# Patient Record
Sex: MALE | Race: WHITE | URBAN - METROPOLITAN AREA
[De-identification: names, ages, dates, MRNs, and addresses within clinical notes are randomized per-mention and may not be internally consistent; named-entity substitution may affect disease eponyms.]

---

## 2017-06-13 ENCOUNTER — HOSPITAL ENCOUNTER (OUTPATIENT)
Dept: ULTRASOUND IMAGING | Age: 52
Discharge: HOME | End: 2017-06-13
Attending: INTERNAL MEDICINE | Admitting: INTERNAL MEDICINE

## 2017-06-13 DIAGNOSIS — N18.30 CHRONIC KIDNEY DISEASE, STAGE III (MODERATE) (HCC): ICD-10-CM

## 2017-06-13 DIAGNOSIS — N18.9 CHRONIC KIDNEY DISEASE, UNSPECIFIED STAGE: ICD-10-CM

## 2022-01-17 LAB
CHOLESTEROL, TOTAL: 98 MG/DL
CHOLESTEROL/HDL RATIO: NORMAL
HDLC SERPL-MCNC: 39 MG/DL (ref 35–70)
LDL CHOLESTEROL CALCULATED: 25 MG/DL (ref 0–160)
NONHDLC SERPL-MCNC: 59 MG/DL
TRIGL SERPL-MCNC: 169 MG/DL
VLDLC SERPL CALC-MCNC: NORMAL MG/DL

## 2022-06-22 LAB
AVERAGE GLUCOSE: NORMAL
CREATININE, URINE: 87
HBA1C MFR BLD: 8.9 %
MICROALBUMIN/CREAT 24H UR: 31.4 MG/G{CREAT}
MICROALBUMIN/CREAT UR-RTO: 361

## 2022-09-28 LAB
AVERAGE GLUCOSE: NORMAL
HBA1C MFR BLD: 8.3 %

## 2022-11-14 ENCOUNTER — TELEPHONE (OUTPATIENT)
Dept: PHARMACY | Facility: CLINIC | Age: 57
End: 2022-11-14

## 2022-11-14 ENCOUNTER — CLINICAL DOCUMENTATION (OUTPATIENT)
Dept: PHARMACY | Facility: CLINIC | Age: 57
End: 2022-11-14

## 2022-11-14 NOTE — TELEPHONE ENCOUNTER
Patient called in and asked for application to be sent to his email. [email or web address]      Pharmacy Pop Care Documentation:   Patient is missing the following requirements: DM Program Application    If completed by 11/25/22 patient will be eligible for enrollment in the DM Program on 12/01/22. Email sent.        Loretta Bhakta, [address]   Phone: [phone]       For Pharmacy Admin Tracking Only    CPA in place:  No  Time Spent (min): 20

## 2022-11-14 NOTE — PROGRESS NOTES
Pharmacy Pop Care Documentation:     AVS received for required office visit on:  9/28/22: Michael Johnson per Care Everywhere

## 2022-11-14 NOTE — LETTER
[address], [address]  Phone: toll free [phone], option 3  Fax: [address] [facility] [address] [place] [address]           11/16/22     Dear Kurt Huston,   [address]! You have successfully enrolled in the Be Well With Diabetes program for 2022. What you receive  Beginning December 1, you will begin receiving up to $300 in waived copays for specific medications and pharmacy-related supplies purchased through our home delivery pharmacy, [facility]. A list of eligible medications and pharmacy supplies can be found at [employer] under Be Well With Diabetes. In addition, you'll receive advice and help from our pharmacists, associate care management team and diabetes educators. (And, if you also participate in the Be Well program, you can earn points and Lifestyle Management or Health Management program credit, if applicable.)    What you need to do  To maintain your benefit this year and remain eligible next year, complete the following requirements:        *Can be satisfied through [employer] Screening on-site. **Requirements to enroll must be completed within 6 months of enrollment date **Pneumonia vaccine is dependent on previous immunization and your age. Remember, program requirements must be completed by deadlines shown. If not, your benefit may be terminated, and you will not be eligible to participate again until the following year. To keep you on track, we'll review your [employer] account and send reminders for action. (If you don't have a [facility] Account, submit documentation to [email or web address] or by fax to [phone].)    Congratulations and thank you for taking steps to improve your health and to Be Well With Diabetes.     [facility]  Phone: toll free [phone], option 3  Email: [email or web address]  Fax: [phone]

## 2022-11-16 NOTE — TELEPHONE ENCOUNTER
Pharmacy Pop Care Documentation:   Patient has completed all the requirements by 11/25/22 and therefore will be enrolled in the DM Program on 12/01/22. Application received: 69/97/32  Application scanned. Letter mailed to patient.       Pablo Mistry, [facility]   Phone: [phone]

## 2022-12-01 ENCOUNTER — CLINICAL DOCUMENTATION (OUTPATIENT)
Dept: PHARMACY | Facility: CLINIC | Age: 57
End: 2022-12-01

## 2022-12-01 NOTE — PROGRESS NOTES
Pharmacy Pop Care Documentation:      The application for Domi Gautam for enrollment into the diabetes management program has been reviewed and accepted on 12/1/22.     [address] in place:  No  Gap Closed?: Yes   Time Spent (min): 5

## 2022-12-07 ENCOUNTER — TELEPHONE (OUTPATIENT)
Dept: PHARMACY | Facility: CLINIC | Age: 57
End: 2022-12-07

## 2022-12-07 NOTE — TELEPHONE ENCOUNTER
**Patient is Ensemble**  Called patient to schedule 2022 yearly pharmacist appointment to discuss medications for Diabetes Management Program.    Spoke to patient and appointment scheduled for 12/13/22 at [address], [address]   Department, toll free: [phone] Option #3    For Pharmacy [address] [facility] in place:  No  Recommendation Provided To: Patient/Caregiver: 1 via Telephone  Intervention Detail: Scheduled Appointment  Gap Closed?: Yes   Intervention Accepted By: Patient/Caregiver: 1  Time Spent (min): 10

## 2022-12-13 ENCOUNTER — TELEPHONE (OUTPATIENT)
Dept: PHARMACY | Facility: CLINIC | Age: 57
End: 2022-12-13

## 2022-12-13 RX ORDER — ATORVASTATIN CALCIUM 80 MG/1
80 TABLET, FILM COATED ORAL DAILY
COMMUNITY

## 2022-12-13 RX ORDER — LOSARTAN POTASSIUM AND HYDROCHLOROTHIAZIDE 12.5; 1 MG/1; MG/1
1 TABLET ORAL DAILY
COMMUNITY

## 2022-12-13 RX ORDER — INSULIN GLARGINE 100 [IU]/ML
INJECTION, SOLUTION SUBCUTANEOUS
COMMUNITY

## 2022-12-13 RX ORDER — INSULIN LISPRO 100 [IU]/ML
INJECTION, SOLUTION INTRAVENOUS; SUBCUTANEOUS
COMMUNITY

## 2022-12-13 RX ORDER — LEVOTHYROXINE SODIUM 0.07 MG/1
75 TABLET ORAL DAILY
COMMUNITY

## 2022-12-13 NOTE — TELEPHONE ENCOUNTER
[facility] CLINICAL PHARMACY REVIEW - Be Well with Diabetes    Alyse Mcclure is a 65 y.o. male enrolled in the [facility] [employer] Diabetes Program. Patient provided Collin Saenz with verbal consent to remain in the program for this year. Patient enrolled 12/1/22. Insurance through the following employer: [employer]    Medications:  Current Outpatient Medications   Medication Sig Dispense Refill    ondansetron (ZOFRAN ODT) 4 MG disintegrating tablet Take 1-2 tablets by mouth every 12 hours as needed for Nausea May Sub regular tablet (non-ODT) if insurance does not cover ODT.  12 tablet    No longer taking 0      Current Outpatient Medications   Medication Sig Dispense Refill    losartan-hydroCHLOROthiazide (HYZAAR) 100-12.5 MG per tablet Take 1 tablet by mouth daily      atorvastatin (LIPITOR) 80 MG tablet Take 80 mg by mouth daily      levothyroxine (SYNTHROID) 75 MCG tablet Take 75 mcg by mouth Daily      insulin lispro (HUMALOG) 100 UNIT/ML SOLN injection vial Up to 120 units via insulin pump      insulin glargine (LANTUS SOLOSTAR) 100 UNIT/ML injection pen Use as directed in case of pump failure      Glucagon 3 MG/DOSE POWD by Nasal route       Current Pharmacy: [facility]  Current testing supplies/frequency: Dexcom G6 currently has back up meter- accucheck   Pen needles/syringes: has supply of pen needles for back up    Allergies:  No Known Allergies   Vitals/Labs:  BP Readings from Last 3 Encounters:   No data found for BP     Lab Results   Component Value Date    LABMICR YES 04/16/2019     Lab Results   Component Value Date    LABA1C 8.3 09/28/2022    LABA1C 10.5 (H) 09/21/2010     Lab Results   Component Value Date    CHOL 98 01/17/2022    TRIG 169 01/17/2022    HDL 39 01/17/2022    LDLCALC 25 01/17/2022     ALT   Date Value Ref Range Status   09/21/2015 30 10 - 40 U/L Final     AST   Date Value Ref Range Status   09/21/2015 23 15 - 37 U/L Final Comment:     Specimen hemolysis has exceeded the interference as defined by Roche. Value may be falsely increased. Suggest recollection if clinically  indicated. The ASCVD Risk score (Thanh MARSHALL, et al., 2019) failed to calculate for the following reasons: The systolic blood pressure is missing    The valid total cholesterol range is 130 to 320 mg/dL     Lab Results   Component Value Date    CREATININE 1.6 (H) 04/16/2019     Cr= 1.6 from 1/31/22 per CE    CrCl cannot be calculated (Patient's most recent lab result is older than the maximum 180 days allowed. ). Lab Results   Component Value Date/Time    LABGLOM 45 04/16/2019 07:33 AM    LABGLOM 49 11/12/2018 07:43 AM    LABGLOM 54 09/21/2015 06:40 PM     Immunizations: There is no immunization history on file for this patient. Social History:  Social History     Tobacco Use    Smoking status: Never    Smokeless tobacco: Not on file   Substance Use Topics    Alcohol use: Yes     Alcohol/week: 8.0 standard drinks     Types: 8 Cans of beer per week     Comment: weekends     ASSESSMENT:  Initial Program Requirements (Y indicates has completed for the year, N indicates needs to be completed by 07/01/2022): Yes - Provider Visit for DM (1st)  Yes - A1c (1st)     Ongoing Program Requirements (Y indicates has completed for the year, N indicates needs to be completed by 12/31/2022):   Yes - Provider Visit for DM (2nd)  Yes - ACC/diabetes educator visit (if A1c over 8%)  Yes - A1c (2nd)  Yes - Lipid panel  Yes - Urine microalbumin  No - Pneumococcal vaccination: [id number]  Yes - Influenza vaccination for Fall 2022  No - Medication adherence over 70%  Yes - On statin - atorvastatin  Yes - On ACEi/ARB - losartan-hctz     Current medications eligible for copay waiver, up to $300, through [address] [place]:  - atorvastatin, glucagon, lantus, humalog, levothyroxine, losartan-hydrochlorothiazide   - Prodigy and Dexcom G6     Diabetes Care:   - Glycemic Goal: <7.0% and directed by provider. Is not at blood glucose goal but is on insulin pump therapy. Type 1 DM under inadequate control as evidenced by > 8.  - Current symptoms/problems include none  - Home blood sugar records:  fasting range: 150 . Dexcom is new so no data yet  - has T slim pump  - Any episodes of hypoglycemia? no  - Known diabetic complications: none  - Eye exam current (within one year): no, reminded to schedule this yearly  - Foot exam current (within one year): yes  - Appropriateness of Insulin Therapy: insulin pump managed by primary care, he does not see endo. His primary has DM himself and is comfortable managing pump. - Therapy Optimization: rate was changed and lifestyle changes  - Medication compliance: compliant all of the time  - Diet compliance: compliant most of the time. He does bolus for his meals and is carb counting. Eats out lunch daily bc does not pack. Is doing diet drinks. BMI= 32  - Current exercise: does have a treadmill but he has been sick and not doing it. Other Considerations:  - Blood Pressure Goal: BP less than 140/90 mmHg due to history of DM: Is at blood pressure goal. 130/80 at last pcp visit. - Lipids:  at goal on current treatment atorva 80 mg  - Smoking status: Never smoked    PLAN:  - Consideration(s) for provider:   Glucagon nasal  Prodigy back up meter and supplies  Rate was changed in pump at last visit  Scr 1.6  - DM program gaps identified:   Initial requirements: Requirements met   Ongoing requirements: ACC/diabetes educator visit (if A1c over 8%) and Pneumococcal vaccination: [id number] for 2023 if needed    - Education to patient: Rafaela Activation Code sent to patient, Addressed diet and exercise, Reminded to get yearly retinal exam, Overview of Be Well With Diabetes program, and Overview of HHP   - Follow up: PCP for identified gaps or as scheduled below  - Upcoming appointments:   No future appointments.     Jason Elizabeth, PharmD, [address] Pharmacist  Department: [address] in place:  No  Recommendation Provided To: Provider: 2 via Fax sent to office  Intervention Detail: New Rx: 2, reason: Cost/Formulary Change, Needs Additional Therapy  Gap Closed?: Yes   Intervention Accepted By: Provider: 0  Time Spent (min): 60

## 2023-01-06 ENCOUNTER — TELEPHONE (OUTPATIENT)
Dept: PHARMACY | Facility: CLINIC | Age: 58
End: 2023-01-06

## 2023-01-06 NOTE — LETTER
[facility]  [facility]  [address] [place] [address]        Dave Cates   [address] [facility] [address]         01/12/23     Dear Dave Cates,      Congratulations! You have completed the 2022 requirements for the [facility] Be Well With Diabetes Program. You have been automatically re-enrolled into the [facility] Be Well With Diabetes Program for 2023.    One of the requirements to participate in the [facility] Be Well With Diabetes Program is to complete a Clinical Pharmacist Telephone appointment yearly.   The [facility] Clinical Pharmacy Team has attempted to contact you to schedule your 2023 Diabetes Management telephone appointment but was unable to reach you.     We would like to work with you and your doctor to:  - Review your medications, including over-the-counter and herbal medications  - Answer questions about your medications and how to get the most benefit from them  - Identify potential drug interactions or side effects and help fix them  - Identify preferred medications that are equally effective, but available at a lower cost to you  - Help you reach the necessary requirements to remain enrolled in the Diabetes Management Program offered by [facility]     Please call toll free [phone], option #3 to schedule this appointment to take advantage of this service. Telephone appointments are available Monday thru Friday from 7:30 AM till 5:30 PM.     This is a courtesy reminder. If you have this appointment already scheduled for your 2023 enrollment in the program, please disregard this message. If you have not scheduled this appointment yet, please contact us at the above number to schedule.     Sincerely,      [facility]  Phone: toll free [phone], option #3

## 2023-01-06 NOTE — TELEPHONE ENCOUNTER
2023 Annual Pharmacist Visit  **Patient is Ensemble**    Called patient to schedule 2023 yearly pharmacist appointment to discuss medications for Diabetes Management Program.    No answer. Left VM on TAD: Please call back at [phone] Option #3.      [address] [facility]   Department, toll free: [phone] Option #3

## 2023-01-12 NOTE — TELEPHONE ENCOUNTER
Second attempt made to contact patient to schedule 2023 yearly pharmacist appointment to discuss medications for Diabetes Management Program.    No answer. Left VM on TAD: Please call back at [phone] Option #3.      Letter mailed to patient    [address] [facility]   Department, toll free: [phone] Option #3        For Pharmacy Admin Tracking Only    Program: [address]:  No  Gap Closed?: No   Time Spent (min): 15

## 2023-02-04 ENCOUNTER — APPOINTMENT (OUTPATIENT)
Dept: GENERAL RADIOLOGY | Age: 58
End: 2023-02-04
Payer: COMMERCIAL

## 2023-02-04 ENCOUNTER — HOSPITAL ENCOUNTER (EMERGENCY)
Age: 58
Discharge: HOME | End: 2023-02-04
Payer: COMMERCIAL

## 2023-02-04 VITALS
HEART RATE: 97 BPM | RESPIRATION RATE: 16 BRPM | TEMPERATURE: 98.6 F | DIASTOLIC BLOOD PRESSURE: 84 MMHG | OXYGEN SATURATION: 95 % | BODY MASS INDEX: 32.58 KG/M2 | SYSTOLIC BLOOD PRESSURE: 144 MMHG | HEIGHT: 73 IN | WEIGHT: 245.81 LBS

## 2023-02-04 DIAGNOSIS — S69.91XA INJURY OF RIGHT HAND, INITIAL ENCOUNTER: Primary | ICD-10-CM

## 2023-02-04 DIAGNOSIS — M25.531 RIGHT WRIST PAIN: ICD-10-CM

## 2023-02-04 DIAGNOSIS — R03.0 ELEVATED BLOOD PRESSURE READING: ICD-10-CM

## 2023-02-04 PROCEDURE — 99283 EMERGENCY DEPT VISIT LOW MDM: CPT

## 2023-02-04 PROCEDURE — 73130 X-RAY EXAM OF HAND: CPT

## 2023-02-04 PROCEDURE — 73110 X-RAY EXAM OF WRIST: CPT

## 2023-02-04 ASSESSMENT — PAIN SCALES - GENERAL
PAINLEVEL_OUTOF10: 5
PAINLEVEL_OUTOF10: 5

## 2023-02-04 ASSESSMENT — PAIN DESCRIPTION - LOCATION: LOCATION: HAND

## 2023-02-04 ASSESSMENT — PAIN - FUNCTIONAL ASSESSMENT: PAIN_FUNCTIONAL_ASSESSMENT: 0-10

## 2023-02-04 ASSESSMENT — PAIN DESCRIPTION - ORIENTATION: ORIENTATION: RIGHT

## 2023-02-04 NOTE — DISCHARGE INSTRUCTIONS
Can take anti-inflammatories such as ibuprofen and Tylenol for pain relief. Use ice and elevation to further help with symptoms. If your symptoms persist or worsen make sure you follow-up with orthopedics for further evaluation as they can either repeat an x-ray or get an MRI of the area that is still bothering you. At any time if your symptoms worsen or new concerning symptoms present, make sure you return to the emergency department as soon as possible.

## 2023-02-04 NOTE — ED NOTES
Discharge and education instructions reviewed. Patient verbalized understanding, teach-back successful. Patient denied questions at this time. No acute distress noted. Patient instructed to follow-up as noted - return to emergency department if symptoms worsen. Patient verbalized understanding. Discharged per EDMD with discharged instructions.      Ally Galaviz RN  02/04/23 2532

## 2023-02-05 NOTE — ED PROVIDER NOTES
[address] [facility]        Pt Name: Skylar Ramos  MRN: [id number]  [place] 1965  Date of evaluation: 2/4/2023  Provider: Rex Yepez PA-C  PCP: Joao Her  Note Started: 7:43 PM EST 2/4/23      IVAN. I have evaluated this patient. My supervising physician was available for consultation. CHIEF COMPLAINT       Chief Complaint   Patient presents with    Hand Injury     Pt states he tripped and fell into a wall last night and now has right hand pain. HISTORY OF PRESENT ILLNESS: 1 or more Elements     {History from (Optional):53311}    {Limitations to history (Optional):52010}    Skylar Ramos is a 63 y.o. male who presents ***    Nursing Notes were all reviewed and agreed with or any disagreements were addressed in the HPI. REVIEW OF SYSTEMS :      Review of Systems    Positives and Pertinent negatives as per HPI. SURGICAL HISTORY     Past Surgical History:   Procedure Laterality Date    FRACTURE SURGERY         CURRENTMEDICATIONS       Discharge Medication List as of 2/4/2023  6:08 PM        CONTINUE these medications which have NOT CHANGED    Details   losartan-hydroCHLOROthiazide (HYZAAR) 100-12.5 MG per tablet Take 1 tablet by mouth dailyHistorical Med      atorvastatin (LIPITOR) 80 MG tablet Take 80 mg by mouth dailyHistorical Med      levothyroxine (SYNTHROID) 75 MCG tablet Take 75 mcg by mouth DailyHistorical Med      insulin lispro (HUMALOG) 100 UNIT/ML SOLN injection vial Up to 120 units via insulin pumpHistorical Med      insulin glargine (LANTUS SOLOSTAR) 100 UNIT/ML injection pen Use as directed in case of pump failureHistorical Med      Glucagon 3 MG/DOSE POWD by Nasal routeHistorical Med             ALLERGIES     Patient has no known allergies. FAMILYHISTORY     No family history on file.      SOCIAL HISTORY       Social History     Tobacco Use    Smoking status: Never   Substance Use Topics    Alcohol use: Yes     Alcohol/week: 8.0 standard drinks     Types: 8 Cans of beer per week     Comment: weekends    Drug use: No       SCREENINGS        Loretta Coma Scale  Eye Opening: Spontaneous  Best Verbal Response: Oriented  Best Motor Response: Obeys commands  Tracy Coma Scale Score: 15                CIWA Assessment  BP: (!) 144/84  Heart Rate: 97           PHYSICAL EXAM  1 or more Elements     ED Triage Vitals [02/04/23 1700]   BP Temp Temp Source Heart Rate Resp SpO2 Height Weight   (!) 145/77 98.6 °F (37 °C) Oral (!) 103 16 94 % 6' 1\" (1.854 m) 245 lb 13 oz (111.5 kg)       Physical Exam    ***    DIAGNOSTIC RESULTS   LABS:    Labs Reviewed - No data to display    When ordered only abnormal lab results are displayed. All other labs were within normal range or not returned as of this dictation. EKG: When ordered, EKG's are interpreted by the Emergency Department Physician in the absence of a cardiologist.  Please see their note for interpretation of EKG. RADIOLOGY:   Non-plain film images such as CT, Ultrasound and MRI are read by the radiologist. Plain radiographic images are visualized and preliminarily interpreted by the ED Provider with the below findings:    ***    Interpretation per the Radiologist below, if available at the time of this note:    XR HAND RIGHT (MIN 3 VIEWS)   Final Result   No acute osseous abnormality. XR WRIST RIGHT (MIN 3 VIEWS)   Final Result   No acute osseous abnormality. XR WRIST RIGHT (MIN 3 VIEWS)    Result Date: 2/4/2023  EXAMINATION: 4 XRAY VIEWS OF THE RIGHT WRIST; THREE XRAY VIEWS OF THE RIGHT HAND 2/4/2023 5:19 pm COMPARISON: None.  HISTORY: ORDERING SYSTEM PROVIDED HISTORY: pain after fall, base of thumb TECHNOLOGIST PROVIDED HISTORY: Reason for exam:->pain after fall, base of thumb Reason for Exam: pain after fall, base of thumb; ORDERING SYSTEM PROVIDED HISTORY: pain after fall, middle finger TECHNOLOGIST PROVIDED HISTORY: Reason for exam:->pain after fall, middle finger Reason for Exam: pain after fall, middle finger FINDINGS: There is no evidence of acute fracture. There is normal alignment. No acute joint abnormality. No focal osseous lesion. No focal soft tissue abnormality. Vascular calcifications. No acute osseous abnormality. XR HAND RIGHT (MIN 3 VIEWS)    Result Date: 2/4/2023  EXAMINATION: 4 XRAY VIEWS OF THE RIGHT WRIST; THREE XRAY VIEWS OF THE RIGHT HAND 2/4/2023 5:19 pm COMPARISON: None. HISTORY: ORDERING SYSTEM PROVIDED HISTORY: pain after fall, base of thumb TECHNOLOGIST PROVIDED HISTORY: Reason for exam:->pain after fall, base of thumb Reason for Exam: pain after fall, base of thumb; ORDERING SYSTEM PROVIDED HISTORY: pain after fall, middle finger TECHNOLOGIST PROVIDED HISTORY: Reason for exam:->pain after fall, middle finger Reason for Exam: pain after fall, middle finger FINDINGS: There is no evidence of acute fracture. There is normal alignment. No acute joint abnormality. No focal osseous lesion. No focal soft tissue abnormality. Vascular calcifications. No acute osseous abnormality. No results found. PROCEDURES   Unless otherwise noted below, none     Procedures    CRITICAL CARE TIME (.cctime)   ***    PAST MEDICAL HISTORY      has a past medical history of Arthritis, Diabetes mellitus ([facility] [address] Hyperlipidemia, and Hypertension. Chronic Conditions affecting Care: ***    EMERGENCY DEPARTMENT COURSE and DIFFERENTIAL DIAGNOSIS/MDM:   Vitals:    Vitals:    02/04/23 1700 02/04/23 1745   BP: (!) 145/77 (!) 144/84   Pulse: (!) 103 97   Resp: 16 16   Temp: 98.6 °F (37 °C)    TempSrc: Oral    SpO2: 94% 95%   Weight: 245 lb 13 oz (111.5 kg)    Height: 6' 1\" (1.854 m)        Patient was given the following medications:  Medications - No data to display          Is this patient to be included in the SEP-1 Core Measure due to severe sepsis or septic shock?    {Mvg3BsaRiIb:40783}    CONSULTS: (Who and What was discussed)  None  {Discussion with Other Profesionals (Optional):07198}    {Social Determinants (Optional):57252::\"None\"}    {Records Reviewed (Optional):28362}    CC/HPI Summary, DDx, ED Course, and Reassessment: ***    Disposition Considerations (include 1 Tests not done, Shared Decision Making, Pt Expectation of Test or Tx.): ***  {Escalation of care, including admission/OBS considered:77777}      I am the Primary Clinician of Record. FINAL IMPRESSION      1. Injury of right hand, initial encounter    2.  Right wrist pain          DISPOSITION/PLAN     DISPOSITION Decision To Discharge 02/04/2023 06:04:31 PM      PATIENT REFERRED TO:  Yamilka Briones MD  [address] [place] [address] Drive  [address] [place] [address]  [phone]    Schedule an appointment as soon as possible for a visit in 1 day  for reevaluation    [facility] Emergency Department  [address] [id number]  [phone]  Go to   As needed, If symptoms worsen      DISCHARGE MEDICATIONS:  Discharge Medication List as of 2/4/2023  6:08 PM          DISCONTINUED MEDICATIONS:  Discharge Medication List as of 2/4/2023  6:08 PM                 (Please note that portions of this note were completed with a voice recognition program.  Efforts were made to edit the dictations but occasionally words are mis-transcribed.)    Javid Hurst PA-C (electronically signed) and Reassessment: This is a 63y.o. year old, well-appearing male with  has a past medical history of Arthritis, Diabetes mellitus ([address] Hyperlipidemia, and Hypertension. who presents to the ED with complaint of right hand pain that began yesterday evening. Vitals upon arrival show htn, otherwise wnl. Physical Exam shows as above. Imaging was reviewed and interpreted by radiologist as above showing:   -no acute findings  Imaging was independently reviewed by myself. Consults as above. Ddx includes: fracture, contusion, muscle strain, other    Management Given:  -brace, symptoms improved  Declined medication    Disposition: discharage   Patient was informed to return to the Emergency Department if any new worsening or more concerning symptoms occur, in agreement with plan. Shared decision making was practiced, and patient discharged in stable condition. Informed to follow up with Ortho for further evaluation. Medications were prescribed as below. All questions were answered. Disposition Considerations (include 1 Tests not done, Shared Decision Making, Pt Expectation of Test or Tx.): Considered getting CT however with patient symptoms I believe the risks of radiation exposure to what it would tell us outweighed the benefits. We discussed the importance of following up with an orthopedic physician if his symptoms worsen or persist.  He understood and was in agreement        I am the Primary Clinician of Record. FINAL IMPRESSION      1. Injury of right hand, initial encounter    2. Right wrist pain    3.  Elevated blood pressure reading          DISPOSITION/PLAN     DISPOSITION Decision To Discharge 02/04/2023 06:04:31 PM      PATIENT REFERRED TO:  Nathalie Scott MD  [address] [place] [address]  [address] [place] [address]  [phone]    Schedule an appointment as soon as possible for a visit in 1 day  for reevaluation    [facility] Emergency Department  [address] [facility] [address] [place] [address]  [phone]  Go to   As needed, If symptoms worsen      DISCHARGE MEDICATIONS:  Discharge Medication List as of 2/4/2023  6:08 PM          DISCONTINUED MEDICATIONS:  Discharge Medication List as of 2/4/2023  6:08 PM                 (Please note that portions of this note were completed with a voice recognition program.  Efforts were made to edit the dictations but occasionally words are mis-transcribed.)    Darya Mcclain PA-C (electronically signed)            Darya Mcclain PA-C  02/06/23 9806

## 2023-03-07 ENCOUNTER — TELEPHONE (OUTPATIENT)
Dept: PHARMACY | Facility: CLINIC | Age: 58
End: 2023-03-07

## 2023-03-07 NOTE — TELEPHONE ENCOUNTER
**Patient is Ensemble**     2023 Annual Pharmacist Visit    Called patient to schedule 2023 yearly pharmacist appointment to discuss medications for Diabetes Management Program.    No answer. Left VM.      Please call back at [phone], option #3         [address]   Phone: [phone], option #3

## 2023-03-14 NOTE — TELEPHONE ENCOUNTER
Second attempt made to contact patient to schedule 2023 yearly pharmacist appointment to discuss medications for Diabetes Management Program.      No answer. Left VM. Please call back at [phone], option #3. Pixatet message sent.         Sameer Lynette, [address]   Phone: [phone], option #3     For Pharmacy Admin Tracking Only    Program: [address] place:  No  Gap Closed?: No   Time Spent (min): 10

## 2023-03-15 LAB
AVERAGE GLUCOSE: NORMAL
HBA1C MFR BLD: 8.2 %

## 2023-04-06 ENCOUNTER — TELEPHONE (OUTPATIENT)
Dept: PHARMACY | Facility: CLINIC | Age: 58
End: 2023-04-06

## 2023-04-06 ENCOUNTER — CLINICAL DOCUMENTATION (OUTPATIENT)
Dept: PHARMACY | Facility: CLINIC | Age: 58
End: 2023-04-06

## 2023-04-06 NOTE — TELEPHONE ENCOUNTER
mg/dL     Lab Results   Component Value Date    CREATININE 1.6 (H) 04/16/2019     CrCl cannot be calculated (Patient's most recent lab result is older than the maximum 180 days allowed. ). Lab Results   Component Value Date/Time    LABGLOM 45 04/16/2019 07:33 AM    LABGLOM 49 11/12/2018 07:43 AM    LABGLOM 54 09/21/2015 06:40 PM       Immunizations:  Immunization History   Administered Date(s) Administered    COVID-19, PFIZER PURPLE top, DILUTE for use, (age 15 y+), 30mcg/0.3mL 08/09/2021    Influenza, FLUCELVAX, (age 10 mo+), MDCK, PF, 0.5mL 09/28/2022    Pneumococcal, PPSV23, PNEUMOVAX 23, (age 2y+), SC/IM, 0.5mL 03/21/2013    Zoster Recombinant (Shingrix) 06/22/2022, 09/28/2022      Social History:  Social History     Tobacco Use    Smoking status: Never    Smokeless tobacco: Not on file   Substance Use Topics    Alcohol use: Yes     Alcohol/week: 8.0 standard drinks     Types: 8 Cans of beer per week     Comment: weekends     ASSESSMENT:  Initial Program Requirements (Y indicates has completed for the year, N indicates needs to be completed by 07/01/2023): Yes - Provider Visit for DM (1st)  Yes- A1c (1st)      Ongoing Program Requirements (Y indicates has completed for the year, N indicates needs to be completed by 12/31/2023): No - Provider Visit for DM (2nd)  No - ACC/diabetes educator visit (if A1c over 8%)  No - A1c (2nd)  No - Lipid panel  No - Urine microalbumin  N/A - Pneumococcal vaccination: [id number]  N/A - Influenza vaccination for Fall 2023  N/A - Medication adherence over 70%  Yes - On statin or contraindication(s)  Atorvastatin - Refilled at [facility] 11/2022 for 90ds. He thinks he also filled a 90ds at [facility] at the same time. He does not need at the moment. Yes - On ACEi/ARB or contraindication(s)  Losartan/HCTZ - Has not filled at [facility] yet.  Pt to request thru his [address]    Current medications eligible for copay waiver, up to $600, through [facility]:

## 2023-04-06 NOTE — PROGRESS NOTES
Pharmacy Pop Care Documentation:     AVS received for required office visit on:  3/15/23 with Manasa Clayton NP per careeverywhere    A1c updated.     José Gordillo, PharmD, [facility], toll free: [phone]    For Pharmacy Admin Tracking Only    Program: [address] [place] [address], [address]  Time Spent (min): 5

## 2023-06-22 LAB
AVERAGE GLUCOSE: NORMAL
CREATININE, URINE: 75
HBA1C MFR BLD: 7.9 %
MICROALBUMIN/CREAT 24H UR: 58 MG/G{CREAT}
MICROALBUMIN/CREAT UR-RTO: 785

## 2023-06-26 ENCOUNTER — TELEPHONE (OUTPATIENT)
Dept: PHARMACY | Facility: CLINIC | Age: 58
End: 2023-06-26

## 2023-07-08 ENCOUNTER — APPOINTMENT (OUTPATIENT)
Dept: GENERAL RADIOLOGY | Age: 58
DRG: 872 | End: 2023-07-08
Payer: COMMERCIAL

## 2023-07-08 ENCOUNTER — HOSPITAL ENCOUNTER (EMERGENCY)
Age: 58
Discharge: HOME | DRG: 872 | End: 2023-07-08
Payer: COMMERCIAL

## 2023-07-08 VITALS
HEART RATE: 87 BPM | TEMPERATURE: 98.5 F | RESPIRATION RATE: 16 BRPM | SYSTOLIC BLOOD PRESSURE: 152 MMHG | DIASTOLIC BLOOD PRESSURE: 92 MMHG | OXYGEN SATURATION: 99 %
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
## 2023-07-08 DIAGNOSIS — S81.852A DOG BITE OF LEFT LOWER LEG, INITIAL ENCOUNTER: Primary | ICD-10-CM

## 2023-07-08 PROCEDURE — 99283 EMERGENCY DEPT VISIT LOW MDM: CPT

## 2023-07-08 PROCEDURE — 12034 INTMD RPR S/TR/EXT 7.6-12.5: CPT

## 2023-07-08 PROCEDURE — 73590 X-RAY EXAM OF LOWER LEG: CPT

## 2023-07-08 PROCEDURE — 0HQLXZZ REPAIR LEFT LOWER LEG SKIN, EXTERNAL APPROACH: ICD-10-PCS | Performed by: INTERNAL MEDICINE

## 2023-07-08 NOTE — ED PROVIDER NOTES
**ADVANCED PRACTICE PROVIDER, I HAVE EVALUATED THIS PATIENT**        [address] [facility] [address] [address]      Pt Name: Patricio Schulz  PEV:[id number]  [address] [facility] 1965  Date of evaluation: 7/8/2023  Provider: Oscar Roca PA-C  Note Started: 3:27 PM EDT 7/8/2023        Chief Complaint:    Chief Complaint   Patient presents with    Laceration     Left lower leg laceration. Possible dog bite. Dogs vaccinated. Pt was splitting up fighting dogs and was cut. Not sure if he hit his leg on something or was bit. Tetanus ~ 2 weeks ago         Nursing Notes, Past Medical Hx, Past Surgical Hx, Social Hx, Allergies, and Family Hx were all reviewed and agreed with or any disagreements were addressed in the HPI.    HPI: (Location, Duration, Timing, Severity, Quality, Assoc Sx, Context, Modifying factors)    History From: Patient          Chief Complaint of laceration left lower extremity    This is a  63 y.o. male who presents stating that he was breaking up a dog fight between 2 of his dogs and suddenly noticed that he was bleeding from the left lower leg. He does have some chronic edema and thin skin down there and also some blistering that happens from time to time. However now there is a laceration on the front and on the back of the left lower leg. Patient states that it is minimally tender however he knew he needed some care because he is diabetic. Therefore he came to the ER. Recent tetanus shot was performed in the last few months he states.     PastMedical/Surgical History:      Diagnosis Date    Arthritis     Diabetes mellitus ([address])     Hyperlipidemia     Hypertension          Procedure Laterality Date    FRACTURE SURGERY         Medications:  Previous Medications    ATORVASTATIN (LIPITOR) 80 MG TABLET    Take 1 tablet by mouth daily    GLUCAGON 3 MG/DOSE POWD    by Nasal route    INSULIN GLARGINE (LANTUS SOLOSTAR) 100 UNIT/ML INJECTION PEN    Use as directed

## 2023-07-08 NOTE — DISCHARGE INSTRUCTIONS
Home in stable condition to gently clean the wound areas about once or twice daily, dry them, apply some Neosporin or equivalent and a dry bandage. Monitor especially over the next week or so to make sure that signs of infection such as increased heat redness pain unexplained fever or other complication does not occur. Follow with family doctor for further care and treatment. Sutures will need to be removed in about 2 weeks. Return to the ER for any emergency worsen or concern.

## 2023-07-08 NOTE — ED NOTES
Pt discharged at this time. Discharge instructions and medications reviewed,  Questions were answered. PT verbalized understanding. VSS, Afebrile. Follow up appointments were discussed.            Penelope Leon RN  07/08/23 [id number]

## 2023-07-10 ENCOUNTER — APPOINTMENT (OUTPATIENT)
Dept: GENERAL RADIOLOGY | Age: 58
DRG: 872 | End: 2023-07-10
Payer: COMMERCIAL

## 2023-07-10 ENCOUNTER — HOSPITAL ENCOUNTER (INPATIENT)
Age: 58
LOS: 4 days | Discharge: HOME | DRG: 872 | End: 2023-07-14
Attending: INTERNAL MEDICINE | Admitting: INTERNAL MEDICINE
Payer: COMMERCIAL

## 2023-07-10 DIAGNOSIS — R79.82 HIGH C-REACTIVE PROTEIN: ICD-10-CM

## 2023-07-10 DIAGNOSIS — Z86.39 H/O INSULIN DEPENDENT DIABETES MELLITUS: ICD-10-CM

## 2023-07-10 DIAGNOSIS — R70.0 ELEVATED SED RATE: ICD-10-CM

## 2023-07-10 DIAGNOSIS — N17.9 AKI (ACUTE KIDNEY INJURY) (HCC): ICD-10-CM

## 2023-07-10 DIAGNOSIS — R65.20 SEVERE SEPSIS (HCC): ICD-10-CM

## 2023-07-10 DIAGNOSIS — A41.9 SEVERE SEPSIS (HCC): ICD-10-CM

## 2023-07-10 DIAGNOSIS — L08.9 INFECTED DOG BITE: Primary | ICD-10-CM

## 2023-07-10 DIAGNOSIS — Z98.890 HISTORY OF MAJOR ORTHOPEDIC SURGERY: ICD-10-CM
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
## 2023-07-10 LAB
ALBUMIN SERPL-MCNC: 3.8 G/DL (ref 3.4–5)
ALBUMIN/GLOB SERPL: 1.3 {RATIO} (ref 1.1–2.2)
ALP SERPL-CCNC: 122 U/L (ref 40–129)
ALT SERPL-CCNC: 16 U/L (ref 10–40)
ANION GAP SERPL CALCULATED.3IONS-SCNC: 16 MMOL/L (ref 3–16)
AST SERPL-CCNC: 23 U/L (ref 15–37)
BILIRUB SERPL-MCNC: 0.5 MG/DL (ref 0–1)
BUN SERPL-MCNC: 23 MG/DL (ref 7–20)
CALCIUM SERPL-MCNC: 8.3 MG/DL (ref 8.3–10.6)
CHLORIDE SERPL-SCNC: 102 MMOL/L (ref 99–110)
CO2 SERPL-SCNC: 20 MMOL/L (ref 21–32)
CREAT SERPL-MCNC: 1.7 MG/DL (ref 0.9–1.3)
CRP SERPL-MCNC: 63.1 MG/L (ref 0–5.1)
DEPRECATED RDW RBC AUTO: 15 % (ref 12.4–15.4)
EKG ATRIAL RATE: 105 BPM
EKG DIAGNOSIS: NORMAL
EKG P AXIS: 47 DEGREES
EKG P-R INTERVAL: 158 MS
EKG Q-T INTERVAL: 322 MS
EKG QRS DURATION: 86 MS
EKG QTC CALCULATION (BAZETT): 425 MS
EKG R AXIS: 51 DEGREES
EKG T AXIS: 49 DEGREES
EKG VENTRICULAR RATE: 105 BPM
ERYTHROCYTE [SEDIMENTATION RATE] IN BLOOD BY WESTERGREN METHOD: 46 MM/HR (ref 0–20)
GFR SERPLBLD CREATININE-BSD FMLA CKD-EPI: 46 ML/MIN/{1.73_M2}
GLUCOSE BLD-MCNC: 116 MG/DL (ref 70–99)
GLUCOSE BLD-MCNC: 67 MG/DL (ref 70–99)
GLUCOSE SERPL-MCNC: 172 MG/DL (ref 70–99)
HCT VFR BLD AUTO: 48.6 % (ref 40.5–52.5)
HGB BLD-MCNC: 16.2 G/DL (ref 13.5–17.5)
LACTATE BLDV-SCNC: 1.4 MMOL/L (ref 0.4–1.9)
LACTATE BLDV-SCNC: 1.9 MMOL/L (ref 0.4–1.9)
MCH RBC QN AUTO: 27.3 PG (ref 26–34)
MCHC RBC AUTO-ENTMCNC: 33.4 G/DL (ref 31–36)
MCV RBC AUTO: 81.9 FL (ref 80–100)
PERFORMED ON: ABNORMAL
PERFORMED ON: ABNORMAL
PLATELET # BLD AUTO: 176 K/UL (ref 135–450)
PLATELET BLD QL SMEAR: ADEQUATE
PMV BLD AUTO: 9.1 FL (ref 5–10.5)
POTASSIUM SERPL-SCNC: 5 MMOL/L (ref 3.5–5.1)
PROT SERPL-MCNC: 6.7 G/DL (ref 6.4–8.2)
RBC # BLD AUTO: 5.93 M/UL (ref 4.2–5.9)
SLIDE REVIEW: ABNORMAL
SODIUM SERPL-SCNC: 138 MMOL/L (ref 136–145)
WBC # BLD AUTO: 13 K/UL (ref 4–11)

## 2023-07-10 PROCEDURE — 86140 C-REACTIVE PROTEIN: CPT

## 2023-07-10 PROCEDURE — 1200000000 HC SEMI PRIVATE

## 2023-07-10 PROCEDURE — 96361 HYDRATE IV INFUSION ADD-ON: CPT

## 2023-07-10 PROCEDURE — 6360000002 HC RX W HCPCS: Performed by: PHYSICIAN ASSISTANT

## 2023-07-10 PROCEDURE — 2580000003 HC RX 258: Performed by: INTERNAL MEDICINE

## 2023-07-10 PROCEDURE — 2580000003 HC RX 258: Performed by: PHYSICIAN ASSISTANT

## 2023-07-10 PROCEDURE — 87040 BLOOD CULTURE FOR BACTERIA: CPT

## 2023-07-10 PROCEDURE — 6370000000 HC RX 637 (ALT 250 FOR IP): Performed by: INTERNAL MEDICINE

## 2023-07-10 PROCEDURE — 85652 RBC SED RATE AUTOMATED: CPT

## 2023-07-10 PROCEDURE — 83605 ASSAY OF LACTIC ACID: CPT

## 2023-07-10 PROCEDURE — 6370000000 HC RX 637 (ALT 250 FOR IP): Performed by: PHYSICIAN ASSISTANT

## 2023-07-10 PROCEDURE — 93010 ELECTROCARDIOGRAM REPORT: CPT | Performed by: INTERNAL MEDICINE

## 2023-07-10 PROCEDURE — 96366 THER/PROPH/DIAG IV INF ADDON: CPT

## 2023-07-10 PROCEDURE — 85027 COMPLETE CBC AUTOMATED: CPT

## 2023-07-10 PROCEDURE — 96365 THER/PROPH/DIAG IV INF INIT: CPT

## 2023-07-10 PROCEDURE — 6360000002 HC RX W HCPCS: Performed by: INTERNAL MEDICINE

## 2023-07-10 PROCEDURE — 73610 X-RAY EXAM OF ANKLE: CPT

## 2023-07-10 PROCEDURE — 80053 COMPREHEN METABOLIC PANEL: CPT

## 2023-07-10 PROCEDURE — 36415 COLL VENOUS BLD VENIPUNCTURE: CPT

## 2023-07-10 PROCEDURE — 99285 EMERGENCY DEPT VISIT HI MDM: CPT

## 2023-07-10 PROCEDURE — 96367 TX/PROPH/DG ADDL SEQ IV INF: CPT

## 2023-07-10 PROCEDURE — 96375 TX/PRO/DX INJ NEW DRUG ADDON: CPT

## 2023-07-10 PROCEDURE — 93005 ELECTROCARDIOGRAM TRACING: CPT | Performed by: INTERNAL MEDICINE

## 2023-07-10 RX ORDER — ONDANSETRON 2 MG/ML
4 INJECTION INTRAMUSCULAR; INTRAVENOUS ONCE
Status: COMPLETED | OUTPATIENT
Start: 2023-07-10 | End: 2023-07-10

## 2023-07-10 RX ORDER — SODIUM CHLORIDE 9 MG/ML
INJECTION, SOLUTION INTRAVENOUS CONTINUOUS
Status: DISCONTINUED | OUTPATIENT
Start: 2023-07-10 | End: 2023-07-12

## 2023-07-10 RX ORDER — SODIUM CHLORIDE 0.9 % (FLUSH) 0.9 %
5-40 SYRINGE (ML) INJECTION PRN
Status: DISCONTINUED | OUTPATIENT
Start: 2023-07-10 | End: 2023-07-14 | Stop reason: HOSPADM

## 2023-07-10 RX ORDER — 0.9 % SODIUM CHLORIDE 0.9 %
1400 INTRAVENOUS SOLUTION INTRAVENOUS ONCE
Status: COMPLETED | OUTPATIENT
Start: 2023-07-10 | End: 2023-07-10

## 2023-07-10 RX ORDER — ACETAMINOPHEN 325 MG/1
650 TABLET ORAL EVERY 6 HOURS PRN
Status: DISCONTINUED | OUTPATIENT
Start: 2023-07-10 | End: 2023-07-14 | Stop reason: HOSPADM

## 2023-07-10 RX ORDER — SODIUM CHLORIDE 0.9 % (FLUSH) 0.9 %
5-40 SYRINGE (ML) INJECTION EVERY 12 HOURS SCHEDULED
Status: DISCONTINUED | OUTPATIENT
Start: 2023-07-10 | End: 2023-07-14 | Stop reason: HOSPADM

## 2023-07-10 RX ORDER — LEVOTHYROXINE SODIUM 0.07 MG/1
75 TABLET ORAL DAILY
Status: DISCONTINUED | OUTPATIENT
Start: 2023-07-11 | End: 2023-07-14 | Stop reason: HOSPADM

## 2023-07-10 RX ORDER — MORPHINE SULFATE 10 MG/ML
6 INJECTION, SOLUTION INTRAMUSCULAR; INTRAVENOUS ONCE
Status: COMPLETED | OUTPATIENT
Start: 2023-07-10 | End: 2023-07-10

## 2023-07-10 RX ORDER — ACETAMINOPHEN 650 MG/1
650 SUPPOSITORY RECTAL EVERY 6 HOURS PRN
Status: DISCONTINUED | OUTPATIENT
Start: 2023-07-10 | End: 2023-07-14 | Stop reason: HOSPADM

## 2023-07-10 RX ORDER — INSULIN LISPRO 100 [IU]/ML
0-4 INJECTION, SOLUTION INTRAVENOUS; SUBCUTANEOUS
Status: DISCONTINUED | OUTPATIENT
Start: 2023-07-10 | End: 2023-07-11

## 2023-07-10 RX ORDER — ENOXAPARIN SODIUM 100 MG/ML
30 INJECTION SUBCUTANEOUS 2 TIMES DAILY
Status: DISCONTINUED | OUTPATIENT
Start: 2023-07-11 | End: 2023-07-14 | Stop reason: HOSPADM

## 2023-07-10 RX ORDER — SODIUM CHLORIDE 9 MG/ML
INJECTION, SOLUTION INTRAVENOUS PRN
Status: DISCONTINUED | OUTPATIENT
Start: 2023-07-10 | End: 2023-07-14 | Stop reason: HOSPADM

## 2023-07-10 RX ORDER — ATORVASTATIN CALCIUM 80 MG/1
80 TABLET, FILM COATED ORAL NIGHTLY
Status: DISCONTINUED | OUTPATIENT
Start: 2023-07-10 | End: 2023-07-14 | Stop reason: HOSPADM

## 2023-07-10 RX ORDER — INSULIN LISPRO 100 [IU]/ML
0-4 INJECTION, SOLUTION INTRAVENOUS; SUBCUTANEOUS NIGHTLY
Status: DISCONTINUED | OUTPATIENT
Start: 2023-07-10 | End: 2023-07-11

## 2023-07-10 RX ORDER — 0.9 % SODIUM CHLORIDE 0.9 %
1000 INTRAVENOUS SOLUTION INTRAVENOUS ONCE
Status: COMPLETED | OUTPATIENT
Start: 2023-07-10 | End: 2023-07-10

## 2023-07-10 RX ORDER — ACETAMINOPHEN 500 MG
1000 TABLET ORAL ONCE
Status: COMPLETED | OUTPATIENT
Start: 2023-07-10 | End: 2023-07-10

## 2023-07-10 RX ADMIN — ATORVASTATIN CALCIUM 80 MG: 80 TABLET, FILM COATED ORAL at 22:07

## 2023-07-10 RX ADMIN — ONDANSETRON 4 MG: 2 INJECTION INTRAMUSCULAR; INTRAVENOUS at 11:21

## 2023-07-10 RX ADMIN — SODIUM CHLORIDE 3000 MG: 9 INJECTION, SOLUTION INTRAVENOUS at 13:14

## 2023-07-10 RX ADMIN — CEFEPIME 2000 MG: 2 INJECTION, POWDER, FOR SOLUTION INTRAVENOUS at 22:10

## 2023-07-10 RX ADMIN — SODIUM CHLORIDE 1400 ML: 9 INJECTION, SOLUTION INTRAVENOUS at 10:52

## 2023-07-10 RX ADMIN — HYDROMORPHONE HYDROCHLORIDE 0.5 MG: 1 INJECTION, SOLUTION INTRAMUSCULAR; INTRAVENOUS; SUBCUTANEOUS at 18:02

## 2023-07-10 RX ADMIN — VANCOMYCIN HYDROCHLORIDE 1500 MG: 1.5 INJECTION, POWDER, LYOPHILIZED, FOR SOLUTION INTRAVENOUS at 11:27

## 2023-07-10 RX ADMIN — ACETAMINOPHEN 1000 MG: 500 TABLET ORAL at 10:52

## 2023-07-10 RX ADMIN — SODIUM CHLORIDE: 9 INJECTION, SOLUTION INTRAVENOUS at 22:09

## 2023-07-10 RX ADMIN — SODIUM CHLORIDE 1000 ML: 9 INJECTION, SOLUTION INTRAVENOUS at 10:53

## 2023-07-10 RX ADMIN — Medication 10 ML: at 21:55

## 2023-07-10 RX ADMIN — MORPHINE SULFATE 6 MG: 10 INJECTION, SOLUTION INTRAMUSCULAR; INTRAVENOUS at 11:21

## 2023-07-10 RX ADMIN — ACETAMINOPHEN 650 MG: 325 TABLET ORAL at 22:07

## 2023-07-10 SDOH — HEALTH STABILITY: PHYSICAL HEALTH: ON AVERAGE, HOW MANY MINUTES DO YOU ENGAGE IN EXERCISE AT THIS LEVEL?: 30 MIN

## 2023-07-10 SDOH — ECONOMIC STABILITY: TRANSPORTATION INSECURITY
IN THE PAST 12 MONTHS, HAS LACK OF TRANSPORTATION KEPT YOU FROM MEETINGS, WORK, OR FROM GETTING THINGS NEEDED FOR DAILY LIVING?: NO

## 2023-07-10 SDOH — ECONOMIC STABILITY: FOOD INSECURITY: WITHIN THE PAST 12 MONTHS, THE FOOD YOU BOUGHT JUST DIDN'T LAST AND YOU DIDN'T HAVE MONEY TO GET MORE.: NEVER TRUE

## 2023-07-10 SDOH — ECONOMIC STABILITY: TRANSPORTATION INSECURITY
IN THE PAST 12 MONTHS, HAS THE LACK OF TRANSPORTATION KEPT YOU FROM MEDICAL APPOINTMENTS OR FROM GETTING MEDICATIONS?: NO

## 2023-07-10 SDOH — HEALTH STABILITY: PHYSICAL HEALTH: ON AVERAGE, HOW MANY DAYS PER WEEK DO YOU ENGAGE IN MODERATE TO STRENUOUS EXERCISE (LIKE A BRISK WALK)?: 7 DAYS

## 2023-07-10 SDOH — ECONOMIC STABILITY: HOUSING INSECURITY
IN THE LAST 12 MONTHS, WAS THERE A TIME WHEN YOU DID NOT HAVE A STEADY PLACE TO SLEEP OR SLEPT IN A SHELTER (INCLUDING NOW)?: NO

## 2023-07-10 SDOH — ECONOMIC STABILITY: INCOME INSECURITY: IN THE LAST 12 MONTHS, WAS THERE A TIME WHEN YOU WERE NOT ABLE TO PAY THE MORTGAGE OR RENT ON TIME?: NO

## 2023-07-10 SDOH — ECONOMIC STABILITY: FOOD INSECURITY: WITHIN THE PAST 12 MONTHS, YOU WORRIED THAT YOUR FOOD WOULD RUN OUT BEFORE YOU GOT MONEY TO BUY MORE.: NEVER TRUE

## 2023-07-10 ASSESSMENT — SOCIAL DETERMINANTS OF HEALTH (SDOH)
HOW OFTEN DO YOU ATTENT MEETINGS OF THE CLUB OR ORGANIZATION YOU BELONG TO?: NEVER
DO YOU BELONG TO ANY CLUBS OR ORGANIZATIONS SUCH AS CHURCH GROUPS UNIONS, FRATERNAL OR ATHLETIC GROUPS, OR SCHOOL GROUPS?: NO
WITHIN THE LAST YEAR, HAVE YOU BEEN KICKED, HIT, SLAPPED, OR OTHERWISE PHYSICALLY HURT BY YOUR PARTNER OR EX-PARTNER?: NO
WITHIN THE LAST YEAR, HAVE YOU BEEN HUMILIATED OR EMOTIONALLY ABUSED IN OTHER WAYS BY YOUR PARTNER OR EX-PARTNER?: NO
WITHIN THE LAST YEAR, HAVE TO BEEN RAPED OR FORCED TO HAVE ANY KIND OF SEXUAL ACTIVITY BY YOUR PARTNER OR EX-PARTNER?: NO
HOW HARD IS IT FOR YOU TO PAY FOR THE VERY BASICS LIKE FOOD, HOUSING, MEDICAL CARE, AND HEATING?: NOT HARD AT ALL
HOW OFTEN DO YOU GET TOGETHER WITH FRIENDS OR RELATIVES?: MORE THAN THREE TIMES A WEEK
IN A TYPICAL WEEK, HOW MANY TIMES DO YOU TALK ON THE PHONE WITH FAMILY, FRIENDS, OR NEIGHBORS?: MORE THAN THREE TIMES A WEEK
WITHIN THE LAST YEAR, HAVE YOU BEEN AFRAID OF YOUR PARTNER OR EX-PARTNER?: NO
HOW OFTEN DO YOU ATTEND CHURCH OR RELIGIOUS SERVICES?: NEVER

## 2023-07-10 ASSESSMENT — PAIN SCALES - GENERAL
PAINLEVEL_OUTOF10: 8
PAINLEVEL_OUTOF10: 9
PAINLEVEL_OUTOF10: 0
PAINLEVEL_OUTOF10: 8
PAINLEVEL_OUTOF10: 8

## 2023-07-10 ASSESSMENT — LIFESTYLE VARIABLES
HOW MANY STANDARD DRINKS CONTAINING ALCOHOL DO YOU HAVE ON A TYPICAL DAY: 1 OR 2
HOW OFTEN DO YOU HAVE A DRINK CONTAINING ALCOHOL: 2-4 TIMES A MONTH

## 2023-07-10 ASSESSMENT — PAIN - FUNCTIONAL ASSESSMENT: PAIN_FUNCTIONAL_ASSESSMENT: 0-10

## 2023-07-10 NOTE — ED TRIAGE NOTES
Seen here Friday for a dog bite to left lower leg. Comes in today for fever, increased pain, redness and swelling around wound.

## 2023-07-10 NOTE — ED PROVIDER NOTES
[address] [facility] [address] [address]        Pt Name: Alhaji Seo  MRN: [id number]  [address] [facility] 1965  Date of evaluation: 7/10/2023  Provider: Rodri Nunez PA-C  PCP: Cinda Zapata  Note Started: 10:46 AM EDT 7/10/23      IVAN. I have evaluated this patient. CHIEF COMPLAINT       Chief Complaint   Patient presents with    Wound Infection     Stitched up jhere on Friday for dog bite to left leg, did not start abx        HISTORY OF PRESENT ILLNESS: 1 or more Elements     History From: patient            Chief Complaint:infection    Alhaji Seo is a 63 y.o. male who presents today indicating that the dog bite that he got on Friday just between last night on Sunday p.m. and this morning has turned red and hot in that left lower leg and down to the foot, he feels feverish and not well. He is pending attention to the signs of potential infection he was warned of when the wound was repaired just a few days ago by this provider and return immediately when he knew that things were getting worse. No fever reducers of are taken today. No cough or congestion nausea or vomiting. Positive for sluggish, also tender and painful in the left lower leg to foot area worse with movement and better at rest with increased swelling as well. Patient is a insulin-dependent diabetic, also has some ongoing dependent edema in the lower legs, all so a gabe placed through the shaft of the left tibia a few decades ago when patient had an acute fracture in that location with the gabe still remaining. Patient also mentions that he has hardware in the left foot. Nursing Notes were all reviewed and agreed with or any disagreements were addressed in the HPI.     REVIEW OF SYSTEMS :      Review of Systems  Positive history as above, symptoms just today, no headache or vision change, neck pain or stiffness or shortness of breath or chest pain or palpitations syncope or near
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
## 2023-07-11 PROBLEM — R70.0 ELEVATED SED RATE: Status: ACTIVE | Noted: 2023-07-11

## 2023-07-11 PROBLEM — R79.82 HIGH C-REACTIVE PROTEIN: Status: ACTIVE | Noted: 2023-07-11

## 2023-07-11 PROBLEM — Z86.39 H/O INSULIN DEPENDENT DIABETES MELLITUS: Status: ACTIVE | Noted: 2023-07-11

## 2023-07-11 PROBLEM — R65.20 SEVERE SEPSIS (HCC): Status: ACTIVE | Noted: 2023-07-11

## 2023-07-11 PROBLEM — A41.9 SEVERE SEPSIS (HCC): Status: ACTIVE | Noted: 2023-07-11

## 2023-07-11 PROBLEM — L03.116 CELLULITIS AND ABSCESS OF LEFT LEG: Status: ACTIVE | Noted: 2023-07-11

## 2023-07-11 PROBLEM — L08.9 INFECTED DOG BITE: Status: ACTIVE | Noted: 2023-07-11

## 2023-07-11 PROBLEM — Z98.890 HISTORY OF MAJOR ORTHOPEDIC SURGERY: Status: ACTIVE | Noted: 2023-07-11

## 2023-07-11 PROBLEM — N17.9 ACUTE KIDNEY INJURY SUPERIMPOSED ON CKD (HCC): Status: ACTIVE | Noted: 2023-07-11

## 2023-07-11 PROBLEM — L02.416 CELLULITIS AND ABSCESS OF LEFT LEG: Status: ACTIVE | Noted: 2023-07-11

## 2023-07-11 PROBLEM — R50.9 FEVER AND CHILLS: Status: ACTIVE | Noted: 2023-07-11

## 2023-07-11 PROBLEM — N18.9 ACUTE KIDNEY INJURY SUPERIMPOSED ON CKD (HCC): Status: ACTIVE | Noted: 2023-07-11

## 2023-07-11 PROBLEM — E11.65 POORLY CONTROLLED DIABETES MELLITUS (HCC): Status: ACTIVE | Noted: 2023-07-11

## 2023-07-11 LAB
ANION GAP SERPL CALCULATED.3IONS-SCNC: 10 MMOL/L (ref 3–16)
BASOPHILS # BLD: 0 K/UL (ref 0–0.2)
BASOPHILS NFR BLD: 0.4 %
BUN SERPL-MCNC: 24 MG/DL (ref 7–20)
CALCIUM SERPL-MCNC: 7.9 MG/DL (ref 8.3–10.6)
CHLORIDE SERPL-SCNC: 103 MMOL/L (ref 99–110)
CO2 SERPL-SCNC: 24 MMOL/L (ref 21–32)
CREAT SERPL-MCNC: 2 MG/DL (ref 0.9–1.3)
CREAT SERPL-MCNC: 2 MG/DL (ref 0.9–1.3)
DEPRECATED RDW RBC AUTO: 15.4 % (ref 12.4–15.4)
EOSINOPHIL # BLD: 0 K/UL (ref 0–0.6)
EOSINOPHIL NFR BLD: 0.4 %
EST. AVERAGE GLUCOSE BLD GHB EST-MCNC: 191.5 MG/DL
GFR SERPLBLD CREATININE-BSD FMLA CKD-EPI: 38 ML/MIN/{1.73_M2}
GFR SERPLBLD CREATININE-BSD FMLA CKD-EPI: 38 ML/MIN/{1.73_M2}
GLUCOSE BLD-MCNC: 112 MG/DL (ref 70–99)
GLUCOSE BLD-MCNC: 149 MG/DL (ref 70–99)
GLUCOSE BLD-MCNC: 159 MG/DL (ref 70–99)
GLUCOSE BLD-MCNC: 168 MG/DL (ref 70–99)
GLUCOSE SERPL-MCNC: 122 MG/DL (ref 70–99)
HBA1C MFR BLD: 8.3 %
HCT VFR BLD AUTO: 42.1 % (ref 40.5–52.5)
HGB BLD-MCNC: 13.8 G/DL (ref 13.5–17.5)
LACTATE BLDV-SCNC: 0.8 MMOL/L (ref 0.4–1.9)
LYMPHOCYTES # BLD: 1.2 K/UL (ref 1–5.1)
LYMPHOCYTES NFR BLD: 11.6 %
MCH RBC QN AUTO: 27.3 PG (ref 26–34)
MCHC RBC AUTO-ENTMCNC: 32.8 G/DL (ref 31–36)
MCV RBC AUTO: 83.5 FL (ref 80–100)
MONOCYTES # BLD: 1 K/UL (ref 0–1.3)
MONOCYTES NFR BLD: 9.2 %
NEUTROPHILS # BLD: 8.4 K/UL (ref 1.7–7.7)
NEUTROPHILS NFR BLD: 78.4 %
PERFORMED ON: ABNORMAL
PLATELET # BLD AUTO: 128 K/UL (ref 135–450)
PMV BLD AUTO: 8.8 FL (ref 5–10.5)
POTASSIUM SERPL-SCNC: 4.5 MMOL/L (ref 3.5–5.1)
PROCALCITONIN SERPL IA-MCNC: 0.55 NG/ML (ref 0–0.15)
RBC # BLD AUTO: 5.05 M/UL (ref 4.2–5.9)
SODIUM SERPL-SCNC: 137 MMOL/L (ref 136–145)
VANCOMYCIN SERPL-MCNC: 10 UG/ML
WBC # BLD AUTO: 10.7 K/UL (ref 4–11)

## 2023-07-11 PROCEDURE — 6360000002 HC RX W HCPCS: Performed by: INTERNAL MEDICINE

## 2023-07-11 PROCEDURE — 83036 HEMOGLOBIN GLYCOSYLATED A1C: CPT

## 2023-07-11 PROCEDURE — 82565 ASSAY OF CREATININE: CPT

## 2023-07-11 PROCEDURE — 84145 PROCALCITONIN (PCT): CPT

## 2023-07-11 PROCEDURE — 93971 EXTREMITY STUDY: CPT

## 2023-07-11 PROCEDURE — 99223 1ST HOSP IP/OBS HIGH 75: CPT | Performed by: INTERNAL MEDICINE

## 2023-07-11 PROCEDURE — 83605 ASSAY OF LACTIC ACID: CPT

## 2023-07-11 PROCEDURE — 80202 ASSAY OF VANCOMYCIN: CPT

## 2023-07-11 PROCEDURE — 80048 BASIC METABOLIC PNL TOTAL CA: CPT

## 2023-07-11 PROCEDURE — 6370000000 HC RX 637 (ALT 250 FOR IP): Performed by: INTERNAL MEDICINE

## 2023-07-11 PROCEDURE — 36415 COLL VENOUS BLD VENIPUNCTURE: CPT

## 2023-07-11 PROCEDURE — 1200000000 HC SEMI PRIVATE

## 2023-07-11 PROCEDURE — 2580000003 HC RX 258: Performed by: INTERNAL MEDICINE

## 2023-07-11 PROCEDURE — 85025 COMPLETE CBC W/AUTO DIFF WBC: CPT

## 2023-07-11 RX ORDER — VANCOMYCIN 1.75 G/350ML
1250 INJECTION, SOLUTION INTRAVENOUS ONCE
Status: COMPLETED | OUTPATIENT
Start: 2023-07-11 | End: 2023-07-11

## 2023-07-11 RX ORDER — DEXTROSE MONOHYDRATE 100 MG/ML
INJECTION, SOLUTION INTRAVENOUS CONTINUOUS PRN
Status: DISCONTINUED | OUTPATIENT
Start: 2023-07-11 | End: 2023-07-14 | Stop reason: HOSPADM

## 2023-07-11 RX ORDER — DEXTROSE MONOHYDRATE 100 MG/ML
INJECTION, SOLUTION INTRAVENOUS CONTINUOUS PRN
Status: DISCONTINUED | OUTPATIENT
Start: 2023-07-11 | End: 2023-07-11 | Stop reason: SDUPTHER

## 2023-07-11 RX ORDER — SUBCUTANEOUS INSULIN PUMP
EACH MISCELLANEOUS
COMMUNITY

## 2023-07-11 RX ADMIN — ENOXAPARIN SODIUM 30 MG: 100 INJECTION SUBCUTANEOUS at 09:34

## 2023-07-11 RX ADMIN — AMPICILLIN SODIUM AND SULBACTAM SODIUM 3000 MG: 2; 1 INJECTION, POWDER, FOR SOLUTION INTRAMUSCULAR; INTRAVENOUS at 15:43

## 2023-07-11 RX ADMIN — ATORVASTATIN CALCIUM 80 MG: 80 TABLET, FILM COATED ORAL at 20:45

## 2023-07-11 RX ADMIN — Medication 10 ML: at 09:35

## 2023-07-11 RX ADMIN — SODIUM CHLORIDE: 9 INJECTION, SOLUTION INTRAVENOUS at 05:00

## 2023-07-11 RX ADMIN — SODIUM CHLORIDE: 9 INJECTION, SOLUTION INTRAVENOUS at 15:41

## 2023-07-11 RX ADMIN — ENOXAPARIN SODIUM 30 MG: 100 INJECTION SUBCUTANEOUS at 20:45

## 2023-07-11 RX ADMIN — CEFEPIME 2000 MG: 2 INJECTION, POWDER, FOR SOLUTION INTRAVENOUS at 09:39

## 2023-07-11 RX ADMIN — SODIUM CHLORIDE: 9 INJECTION, SOLUTION INTRAVENOUS at 15:32

## 2023-07-11 RX ADMIN — LEVOTHYROXINE SODIUM 75 MCG: 0.07 TABLET ORAL at 05:06

## 2023-07-11 RX ADMIN — VANCOMYCIN 1250 MG: 1.75 INJECTION, SOLUTION INTRAVENOUS at 02:55

## 2023-07-11 ASSESSMENT — PAIN SCALES - GENERAL: PAINLEVEL_OUTOF10: 0

## 2023-07-11 NOTE — CONSULTS
Infectious Diseases Inpatient Consult Note      Reason for Consult:  Severe sepsis left leg cellulitis and open wound following Dog bite     Requesting Physician: Dr. Ed Riojas    Primary Care Physician:  Nayeli Weinstein    History Obtained From:  Epic and pt     CHIEF COMPLAINT:     Chief Complaint   Patient presents with    Wound Infection     Stitched up jhere on Friday for dog bite to left leg, did not start abx          HISTORY OF PRESENT ILLNESS:  63 y.o. man with a history of diabetes, chronic kidney disease stage IIIa, hypertension, hyperlipidemia, diabetes poor control, BMI 31, admitted to hospital secondary to fever chills and left leg open wound and cellulitis. Patient sustained a dog bite significant injury to left leg was seen in ED on  7/8/23 . Patient was breaking the dog fight and sustained bite to Left leg, he has chronic leg edema. He had his wounds sutured in ED not sure why ? And he was never placed on antibiotics either- He now comes back with worsening wound infection ongoing cellulitis with associated sepsis.   Tmax 102.6, WBC  13, Hb 16.2= and LACTIC acid  1.9, X ray Left ankle negative - We are consulted for recommendations  Location :  Left leg pain, swelling and open wound      Quality :     aching +   Severity : 10/10    Duration :2 days  Timing : constant   Context :   Dog bite   Modifying factors : none  Associated signs and symptoms: Fevers, chills Left leg open wound+ laceration + edema and pain         Past Medical History:    Past Medical History:   Diagnosis Date    Arthritis     Diabetes mellitus ([address])     Hyperlipidemia     Hypertension        Past Surgical History:    Past Surgical History:   Procedure Laterality Date    FRACTURE SURGERY         Current Medications:    Outpatient Medications Marked as Taking for the 7/10/23 encounter [facility] Encounter)   Medication Sig Dispense Refill    Insulin Infusion Pump (T:SLIM INSULIN PUMP) MARLEY by Does not apply route      vitamin

## 2023-07-11 NOTE — CARE COORDINATION
Case Management Assessment  Initial Evaluation    Date/Time of Evaluation: 7/11/2023 12:38 PM  Assessment Completed by: MAGDALENO Ferro, TEODORA    If patient is discharged prior to next notation, then this note serves as note for discharge by case management. Patient Name: Radha Maldonado                   YOB: 1965  Diagnosis: Elevated sed rate [R70.0]  JOSUE (acute kidney injury) ([address]) [N17.9]  Infected dog bite [D87. 0XXA, L08.9]  H/O insulin dependent diabetes mellitus [Z86.39]  Sepsis ([address]) [A41.9]  Severe sepsis ([address]) [A41.9, R65.20]  History of major orthopedic surgery [Z98.890]  High C-reactive protein [R79.82]                   Date / Time: 7/10/2023 10:01 AM    Patient Admission Status: Inpatient   Readmission Risk (Low < 19, Mod (19-27), High > 27): Readmission Risk Score: 8.7    Current PCP: Susan Kelley  PCP verified by CM? Yes    Chart Reviewed: Yes      History Provided by: Spouse  Patient Orientation: Alert and Oriented    Patient Cognition: Alert    Hospitalization in the last 30 days (Readmission):  No    If yes, Readmission Assessment in CM Navigator will be completed. Advance Directives:      Code Status: Full Code   Patient's Primary Decision Maker is: Legal Next of Kin      Discharge Planning:    Patient lives with: Spouse/Significant Other, Other (Comment) (several dogs.) Type of Home: House  Primary Care Giver: Self  Patient Support Systems include: Spouse/Significant Other, Children, Family Members, Friends/Neighbors   Current Financial resources: None  Current community resources: None  Current services prior to admission: None            Current DME:              Type of Home Care services:  None    ADLS  Prior functional level: Independent in ADLs/IADLs  Current functional level: Independent in ADLs/IADLs    PT AM-PAC:   /24  OT AM-PAC:   /24    Family can provide assistance at DC:  Yes  Would you like Case Management to discuss the discharge plan with any other family

## 2023-07-11 NOTE — H&P
Hospital Medicine History & Physical      PCP: Zoraida Bumpers    Date of Admission: 7/10/2023    Date of Service: Pt seen/examined on 7/10/23 and Admitted to Inpatient     Chief Complaint:  wound infection    History Of Present Illness: The patient is a 63 y.o. male who presents to [facility] with wound infection. Pt apparently sustained a dog bite on Friday, was seen in ED on 7/8 and discharged with wound care. states just between last night on Sunday p.m. and this morning, it has turned red and hot in that left lower leg and down to the foot, he feels feverish and not well. Past Medical History:        Diagnosis Date    Arthritis     Diabetes mellitus ([address])     Hyperlipidemia     Hypertension        Past Surgical History:        Procedure Laterality Date    FRACTURE SURGERY         Medications Prior to Admission:    Prior to Admission medications    Medication Sig Start Date End Date Taking? Authorizing Provider   vitamin D (CHOLECALCIFEROL) 25 MCG (1000 UT) TABS tablet Take 2 tablets by mouth daily   Yes Historical Provider, MD   losartan-hydroCHLOROthiazide (HYZAAR) 100-12.5 MG per tablet Take 1 tablet by mouth daily    Historical Provider, MD   atorvastatin (LIPITOR) 80 MG tablet Take 1 tablet by mouth daily    Historical Provider, MD   levothyroxine (SYNTHROID) 75 MCG tablet Take 1 tablet by mouth Daily    Historical Provider, MD   insulin lispro (HUMALOG) 100 UNIT/ML SOLN injection vial Up to 120 units via insulin pump    Historical Provider, MD   Glucagon 3 MG/DOSE POWD 3 mg by Nasal route as needed (hypoglycemia)    Historical Provider, MD       Allergies:  Patient has no known allergies. Social History:  The patient currently lives at home    TOBACCO:   reports that he has never smoked. He does not have any smokeless tobacco history on file.   ETOH:

## 2023-07-12 LAB
ANION GAP SERPL CALCULATED.3IONS-SCNC: 10 MMOL/L (ref 3–16)
BASOPHILS # BLD: 0 K/UL (ref 0–0.2)
BASOPHILS NFR BLD: 0.4 %
BUN SERPL-MCNC: 20 MG/DL (ref 7–20)
CALCIUM SERPL-MCNC: 7.8 MG/DL (ref 8.3–10.6)
CHLORIDE SERPL-SCNC: 106 MMOL/L (ref 99–110)
CO2 SERPL-SCNC: 21 MMOL/L (ref 21–32)
CREAT SERPL-MCNC: 1.5 MG/DL (ref 0.9–1.3)
DEPRECATED RDW RBC AUTO: 15.2 % (ref 12.4–15.4)
EOSINOPHIL # BLD: 0.2 K/UL (ref 0–0.6)
EOSINOPHIL NFR BLD: 2.2 %
GFR SERPLBLD CREATININE-BSD FMLA CKD-EPI: 54 ML/MIN/{1.73_M2}
GLUCOSE BLD-MCNC: 116 MG/DL (ref 70–99)
GLUCOSE BLD-MCNC: 122 MG/DL (ref 70–99)
GLUCOSE BLD-MCNC: 150 MG/DL (ref 70–99)
GLUCOSE BLD-MCNC: 158 MG/DL (ref 70–99)
GLUCOSE SERPL-MCNC: 134 MG/DL (ref 70–99)
HCT VFR BLD AUTO: 41.3 % (ref 40.5–52.5)
HGB BLD-MCNC: 13.7 G/DL (ref 13.5–17.5)
LYMPHOCYTES # BLD: 0.9 K/UL (ref 1–5.1)
LYMPHOCYTES NFR BLD: 13.3 %
MCH RBC QN AUTO: 27.7 PG (ref 26–34)
MCHC RBC AUTO-ENTMCNC: 33.2 G/DL (ref 31–36)
MCV RBC AUTO: 83.5 FL (ref 80–100)
MONOCYTES # BLD: 0.6 K/UL (ref 0–1.3)
MONOCYTES NFR BLD: 9.1 %
NEUTROPHILS # BLD: 5.3 K/UL (ref 1.7–7.7)
NEUTROPHILS NFR BLD: 75 %
PERFORMED ON: ABNORMAL
PLATELET # BLD AUTO: 128 K/UL (ref 135–450)
PMV BLD AUTO: 8.8 FL (ref 5–10.5)
POTASSIUM SERPL-SCNC: 4.2 MMOL/L (ref 3.5–5.1)
PROCALCITONIN SERPL IA-MCNC: 0.45 NG/ML (ref 0–0.15)
RBC # BLD AUTO: 4.95 M/UL (ref 4.2–5.9)
SODIUM SERPL-SCNC: 137 MMOL/L (ref 136–145)
WBC # BLD AUTO: 7 K/UL (ref 4–11)

## 2023-07-12 PROCEDURE — 6360000002 HC RX W HCPCS: Performed by: INTERNAL MEDICINE

## 2023-07-12 PROCEDURE — 2580000003 HC RX 258: Performed by: INTERNAL MEDICINE

## 2023-07-12 PROCEDURE — 99233 SBSQ HOSP IP/OBS HIGH 50: CPT | Performed by: INTERNAL MEDICINE

## 2023-07-12 PROCEDURE — 6370000000 HC RX 637 (ALT 250 FOR IP): Performed by: INTERNAL MEDICINE

## 2023-07-12 PROCEDURE — 94760 N-INVAS EAR/PLS OXIMETRY 1: CPT

## 2023-07-12 PROCEDURE — 80048 BASIC METABOLIC PNL TOTAL CA: CPT

## 2023-07-12 PROCEDURE — 84145 PROCALCITONIN (PCT): CPT

## 2023-07-12 PROCEDURE — 85025 COMPLETE CBC W/AUTO DIFF WBC: CPT

## 2023-07-12 PROCEDURE — 36415 COLL VENOUS BLD VENIPUNCTURE: CPT

## 2023-07-12 PROCEDURE — 1200000000 HC SEMI PRIVATE

## 2023-07-12 RX ORDER — OXYCODONE HYDROCHLORIDE 5 MG/1
5 TABLET ORAL EVERY 4 HOURS PRN
Status: DISCONTINUED | OUTPATIENT
Start: 2023-07-12 | End: 2023-07-14 | Stop reason: HOSPADM

## 2023-07-12 RX ADMIN — Medication 10 ML: at 20:58

## 2023-07-12 RX ADMIN — AMPICILLIN SODIUM AND SULBACTAM SODIUM 3000 MG: 2; 1 INJECTION, POWDER, FOR SOLUTION INTRAMUSCULAR; INTRAVENOUS at 21:03

## 2023-07-12 RX ADMIN — SODIUM CHLORIDE: 9 INJECTION, SOLUTION INTRAVENOUS at 00:03

## 2023-07-12 RX ADMIN — ENOXAPARIN SODIUM 30 MG: 100 INJECTION SUBCUTANEOUS at 20:58

## 2023-07-12 RX ADMIN — ENOXAPARIN SODIUM 30 MG: 100 INJECTION SUBCUTANEOUS at 08:49

## 2023-07-12 RX ADMIN — AMPICILLIN SODIUM AND SULBACTAM SODIUM 3000 MG: 2; 1 INJECTION, POWDER, FOR SOLUTION INTRAMUSCULAR; INTRAVENOUS at 08:48

## 2023-07-12 RX ADMIN — AMPICILLIN SODIUM AND SULBACTAM SODIUM 3000 MG: 2; 1 INJECTION, POWDER, FOR SOLUTION INTRAMUSCULAR; INTRAVENOUS at 14:52

## 2023-07-12 RX ADMIN — LEVOTHYROXINE SODIUM 75 MCG: 0.07 TABLET ORAL at 05:59

## 2023-07-12 RX ADMIN — ATORVASTATIN CALCIUM 80 MG: 80 TABLET, FILM COATED ORAL at 20:58

## 2023-07-12 RX ADMIN — SODIUM CHLORIDE: 9 INJECTION, SOLUTION INTRAVENOUS at 06:00

## 2023-07-12 RX ADMIN — AMPICILLIN SODIUM AND SULBACTAM SODIUM 3000 MG: 2; 1 INJECTION, POWDER, FOR SOLUTION INTRAMUSCULAR; INTRAVENOUS at 00:04

## 2023-07-12 NOTE — ADT AUTH CERT
Picture by Wandy Soliz RN       Review Status Review Entered   In Primary 7/12/2023 0753       Created By   Wandy Soliz RN      Criteria Review        DATA:          7/11  by Wandy Soliz RN       Review Status Review Entered   In Primary 7/12/2023 4541       Created By   Wandy Soliz RN      Criteria Review   DATE: 7/11/23 acute care        RELEVANT BASELINES: (lab values, vitals, o2 amount/delivery, etc.)  Stitched up here on Friday for dog bite to left leg, did not start abx   03/15/23 Hemoglobin A1C: 8.2 (E)     PERTINENT UPDATES:  Left leg dog bite with cellulitis and wound infection and Fevers, sepsis IV ABX AND local care with Left leg  elevation   ID consulted,  IV Unasyn x 3 gm Q  8 HRS , ml/hr continuous      VITALS:  100.3 (37.9) 16 78 117/72 91%     ABNL/PERTINENT LABS/RADIOLOGY/DIAGNOSTIC STUDIES:     BUN,BUNPL: 24 (H)  Creatinine: 2.0 (H)  Est, Glom Filt Rate: 38 ! Glucose, Random: 122 (H)  CALCIUM, SERUM, 298812: 7.9 (L)  Procalcitonin: 0.55 (H)     PHYSICAL EXAM:  Left leg extensive cellulitis+ open wound + local redness++     MD CONSULTS/ASSESSMENT AND PLAN:  Per ID  PLAN :  D/C Cefepime  D/C IV Vancomycin   IV Unasyn x 3 gm Q  8 HRS  Left leg elevation, ace wraps  May need IV abx for now  Risk for worsening   If any worsening of the wound needs suture removal   Will send wound cx wth any drainage  Suspect Dogs oral meera like Capnocytophaga or Pasteurella      Per attending  Plan:   Severe Sepsis - 2/2 LLE wound from dog bite  Criteria met: febrile/tachy/leukocytosis. Procal: 0.55  -On Unasyn and Vanco empirically  -Pain control with IV dilaudid  -Venous doppler to r/o DVT  -Blood cx with no growth  -Trend Procal and CRP  -ID onboard        JOSUE on CKD 2/2 diuretics  Creat 2.0, baseline 1.5  -Hold Hyzaar  -Hydration  -Avoid nephrotoxins  -Trend BMP        DM II with chronic insulin use  A1c 8.3.    -fairly controlled  -cont home lantus  -on SSI

## 2023-07-13 LAB
ANION GAP SERPL CALCULATED.3IONS-SCNC: 11 MMOL/L (ref 3–16)
BASOPHILS # BLD: 0 K/UL (ref 0–0.2)
BASOPHILS NFR BLD: 0.5 %
BUN SERPL-MCNC: 18 MG/DL (ref 7–20)
CALCIUM SERPL-MCNC: 8.5 MG/DL (ref 8.3–10.6)
CHLORIDE SERPL-SCNC: 103 MMOL/L (ref 99–110)
CO2 SERPL-SCNC: 23 MMOL/L (ref 21–32)
CREAT SERPL-MCNC: 1.4 MG/DL (ref 0.9–1.3)
DEPRECATED RDW RBC AUTO: 14.6 % (ref 12.4–15.4)
EOSINOPHIL # BLD: 0.2 K/UL (ref 0–0.6)
EOSINOPHIL NFR BLD: 3.2 %
GFR SERPLBLD CREATININE-BSD FMLA CKD-EPI: 58 ML/MIN/{1.73_M2}
GLUCOSE BLD-MCNC: 131 MG/DL (ref 70–99)
GLUCOSE BLD-MCNC: 137 MG/DL (ref 70–99)
GLUCOSE BLD-MCNC: 187 MG/DL (ref 70–99)
GLUCOSE BLD-MCNC: 269 MG/DL (ref 70–99)
GLUCOSE SERPL-MCNC: 171 MG/DL (ref 70–99)
HCT VFR BLD AUTO: 41.8 % (ref 40.5–52.5)
HGB BLD-MCNC: 14.2 G/DL (ref 13.5–17.5)
LYMPHOCYTES # BLD: 0.8 K/UL (ref 1–5.1)
LYMPHOCYTES NFR BLD: 12.5 %
MCH RBC QN AUTO: 27.8 PG (ref 26–34)
MCHC RBC AUTO-ENTMCNC: 33.9 G/DL (ref 31–36)
MCV RBC AUTO: 81.9 FL (ref 80–100)
MONOCYTES # BLD: 0.5 K/UL (ref 0–1.3)
MONOCYTES NFR BLD: 7.4 %
NEUTROPHILS # BLD: 5.1 K/UL (ref 1.7–7.7)
NEUTROPHILS NFR BLD: 76.4 %
PERFORMED ON: ABNORMAL
PLATELET # BLD AUTO: 158 K/UL (ref 135–450)
PMV BLD AUTO: 8.1 FL (ref 5–10.5)
POTASSIUM SERPL-SCNC: 4 MMOL/L (ref 3.5–5.1)
PROCALCITONIN SERPL IA-MCNC: 0.31 NG/ML (ref 0–0.15)
RBC # BLD AUTO: 5.11 M/UL (ref 4.2–5.9)
SODIUM SERPL-SCNC: 137 MMOL/L (ref 136–145)
WBC # BLD AUTO: 6.6 K/UL (ref 4–11)

## 2023-07-13 PROCEDURE — 2580000003 HC RX 258: Performed by: INTERNAL MEDICINE

## 2023-07-13 PROCEDURE — 94760 N-INVAS EAR/PLS OXIMETRY 1: CPT

## 2023-07-13 PROCEDURE — 80048 BASIC METABOLIC PNL TOTAL CA: CPT

## 2023-07-13 PROCEDURE — 84145 PROCALCITONIN (PCT): CPT

## 2023-07-13 PROCEDURE — 6370000000 HC RX 637 (ALT 250 FOR IP): Performed by: INTERNAL MEDICINE

## 2023-07-13 PROCEDURE — 6360000002 HC RX W HCPCS: Performed by: INTERNAL MEDICINE

## 2023-07-13 PROCEDURE — 1200000000 HC SEMI PRIVATE

## 2023-07-13 PROCEDURE — 36415 COLL VENOUS BLD VENIPUNCTURE: CPT

## 2023-07-13 PROCEDURE — 85025 COMPLETE CBC W/AUTO DIFF WBC: CPT

## 2023-07-13 PROCEDURE — 99233 SBSQ HOSP IP/OBS HIGH 50: CPT | Performed by: INTERNAL MEDICINE

## 2023-07-13 RX ADMIN — AMPICILLIN SODIUM AND SULBACTAM SODIUM 3000 MG: 2; 1 INJECTION, POWDER, FOR SOLUTION INTRAMUSCULAR; INTRAVENOUS at 15:12

## 2023-07-13 RX ADMIN — ENOXAPARIN SODIUM 30 MG: 100 INJECTION SUBCUTANEOUS at 08:57

## 2023-07-13 RX ADMIN — SODIUM CHLORIDE: 9 INJECTION, SOLUTION INTRAVENOUS at 09:01

## 2023-07-13 RX ADMIN — AMPICILLIN SODIUM AND SULBACTAM SODIUM 3000 MG: 2; 1 INJECTION, POWDER, FOR SOLUTION INTRAMUSCULAR; INTRAVENOUS at 09:01

## 2023-07-13 RX ADMIN — SODIUM CHLORIDE: 9 INJECTION, SOLUTION INTRAVENOUS at 15:09

## 2023-07-13 RX ADMIN — AMPICILLIN SODIUM AND SULBACTAM SODIUM 3000 MG: 2; 1 INJECTION, POWDER, FOR SOLUTION INTRAMUSCULAR; INTRAVENOUS at 20:54

## 2023-07-13 RX ADMIN — ENOXAPARIN SODIUM 30 MG: 100 INJECTION SUBCUTANEOUS at 20:52

## 2023-07-13 RX ADMIN — LEVOTHYROXINE SODIUM 75 MCG: 0.07 TABLET ORAL at 06:16

## 2023-07-13 RX ADMIN — Medication 10 ML: at 08:57

## 2023-07-13 RX ADMIN — ATORVASTATIN CALCIUM 80 MG: 80 TABLET, FILM COATED ORAL at 20:53

## 2023-07-13 RX ADMIN — AMPICILLIN SODIUM AND SULBACTAM SODIUM 3000 MG: 2; 1 INJECTION, POWDER, FOR SOLUTION INTRAMUSCULAR; INTRAVENOUS at 04:15

## 2023-07-13 ASSESSMENT — PAIN SCALES - GENERAL
PAINLEVEL_OUTOF10: 0
PAINLEVEL_OUTOF10: 0

## 2023-07-13 NOTE — CARE COORDINATION
Per chart review, possible chart review in 1-2 days on PO antibiotics. If requires home healthcare, the spouse is requesting [facility], which has already accepted the patient.      Whitley FERRERA RN  Case Management  [phone]    Electronically signed by Whitley Lyn RN on 7/13/2023 at 6:19 PM

## 2023-07-14 VITALS
SYSTOLIC BLOOD PRESSURE: 150 MMHG | OXYGEN SATURATION: 93 % | BODY MASS INDEX: 31.57 KG/M2 | DIASTOLIC BLOOD PRESSURE: 82 MMHG | HEIGHT: 73 IN | RESPIRATION RATE: 16 BRPM | HEART RATE: 75 BPM | TEMPERATURE: 98.2 F | WEIGHT: 238.2 LBS

## 2023-07-14 LAB
BACTERIA BLD CULT ORG #2: NORMAL
BACTERIA BLD CULT: NORMAL
GLUCOSE BLD-MCNC: 238 MG/DL (ref 70–99)
GLUCOSE BLD-MCNC: 84 MG/DL (ref 70–99)
PERFORMED ON: ABNORMAL
PERFORMED ON: NORMAL

## 2023-07-14 PROCEDURE — 99232 SBSQ HOSP IP/OBS MODERATE 35: CPT | Performed by: INTERNAL MEDICINE

## 2023-07-14 PROCEDURE — 6360000002 HC RX W HCPCS: Performed by: INTERNAL MEDICINE

## 2023-07-14 PROCEDURE — 6370000000 HC RX 637 (ALT 250 FOR IP): Performed by: INTERNAL MEDICINE

## 2023-07-14 PROCEDURE — 2580000003 HC RX 258: Performed by: INTERNAL MEDICINE

## 2023-07-14 RX ORDER — AMOXICILLIN AND CLAVULANATE POTASSIUM 875; 125 MG/1; MG/1
1 TABLET, FILM COATED ORAL 2 TIMES DAILY
Qty: 20 TABLET | Refills: 0 | Status: SHIPPED | OUTPATIENT
Start: 2023-07-14 | End: 2023-07-24

## 2023-07-14 RX ADMIN — AMPICILLIN SODIUM AND SULBACTAM SODIUM 3000 MG: 2; 1 INJECTION, POWDER, FOR SOLUTION INTRAMUSCULAR; INTRAVENOUS at 03:21

## 2023-07-14 RX ADMIN — AMPICILLIN SODIUM AND SULBACTAM SODIUM 3000 MG: 2; 1 INJECTION, POWDER, FOR SOLUTION INTRAMUSCULAR; INTRAVENOUS at 08:29

## 2023-07-14 RX ADMIN — SODIUM CHLORIDE: 9 INJECTION, SOLUTION INTRAVENOUS at 08:27

## 2023-07-14 RX ADMIN — LEVOTHYROXINE SODIUM 75 MCG: 0.07 TABLET ORAL at 06:01

## 2023-07-14 RX ADMIN — ENOXAPARIN SODIUM 30 MG: 100 INJECTION SUBCUTANEOUS at 08:22

## 2023-07-14 RX ADMIN — AMPICILLIN SODIUM AND SULBACTAM SODIUM 3000 MG: 2; 1 INJECTION, POWDER, FOR SOLUTION INTRAMUSCULAR; INTRAVENOUS at 15:36

## 2023-07-14 RX ADMIN — Medication 10 ML: at 08:23

## 2023-07-14 ASSESSMENT — PAIN SCALES - GENERAL: PAINLEVEL_OUTOF10: 0

## 2023-07-14 NOTE — DISCHARGE SUMMARY
Hospital Medicine Discharge Summary    Patient ID: Claudette Dennis      Patient's PCP: Marla Jones    Admit Date: 7/10/2023     Discharge Date: 7/14/2023      Admitting Physician: Iona Bower MD     Discharge Physician: ALICIA Oconnell - CNP     Discharge Diagnoses  Severe Sepsis 2/2 LLE wound from dog bite  Left leg cellulitis  JOSUE on CKD 2/2 diuretics  Past medical history type 2 diabetes, obesity, hypertension, hyperlipidemia, hypertension    Hospital Course: The patient is a 63 y.o. male who presents to [facility] with wound infection. Pt apparently sustained a dog bite on Friday, was seen in ED on 7/8 and discharged with wound care. states just between last night on Sunday p.m. and this morning, it has turned red and hot in that left lower leg and down to the foot, he feels feverish and not well. He was admitted for further treatment. Severe Sepsis 2/2 LLE wound from dog bite  Left leg cellulitis  -Treated with IV Unasyn, discharged on Augmentin x10 days per infectious disease recommendation.  -Sepsis resolved, cellulitis significantly improved  -Sutures to bite wounds to be removed next week, patient aware  -Patient educated on signs symptoms of worsening infection and to return to ER if these were to occur. He verbalized understanding. JOSUE on CKD 2/2 diuretics  -With IV fluids and resolved    Past medical history type 2 diabetes, obesity, hypertension, hyperlipidemia, hypertension  -Continue home medications as ordered      Patient stated they felt well and wanted to go home. Patient denied chest pain, shortness of breath, palpitations, abdominal pain, nausea vomiting, diarrhea, dysuria, headache lightheadedness or dizziness. Appetite good. Voiding without difficulty. Reviewed plan of care with patient, verbalized understanding and agreement. Denied further questions or needs. Offered home health care at discharge, patient declined.       Physical Exam Performed:

## 2023-07-14 NOTE — DISCHARGE INSTRUCTIONS
Follow up with pcp in 1 week  If redness reoccurs, swelling worsens, or becomes painful, come to ER for evaluation.  If theres a lot of swelling, may need sutures removed sooner  Suture removal 7/21/23

## 2023-07-14 NOTE — PLAN OF CARE
Problem: Discharge Planning  Goal: Discharge to home or other facility with appropriate resources  7/11/2023 1240 by Ana Laura Brown RN  Outcome: Progressing  Flowsheets (Taken 7/11/2023 0948)  Discharge to home or other facility with appropriate resources:   Identify barriers to discharge with patient and caregiver   Arrange for needed discharge resources and transportation as appropriate   Identify discharge learning needs (meds, wound care, etc)     Problem: Pain  Goal: Verbalizes/displays adequate comfort level or baseline comfort level  7/11/2023 1240 by Ana Laura Brown RN  Outcome: Progressing  Flowsheets (Taken 7/11/2023 0743)  Verbalizes/displays adequate comfort level or baseline comfort level:   Encourage patient to monitor pain and request assistance   Assess pain using appropriate pain scale   Administer analgesics based on type and severity of pain and evaluate response   Implement non-pharmacological measures as appropriate and evaluate response   Consider cultural and social influences on pain and pain management   Notify Licensed Independent Practitioner if interventions unsuccessful or patient reports new pain     Problem: Safety - Adult  Goal: Free from fall injury  7/11/2023 1240 by Ana Laura Brown RN  Outcome: Progressing  Flowsheets (Taken 7/11/2023 0948)  Free From Fall Injury: Instruct family/caregiver on patient safety     Problem: ABCDS Injury Assessment  Goal: Absence of physical injury  7/11/2023 1240 by Ana Laura Brown RN  Outcome: Progressing  Flowsheets (Taken 7/11/2023 0948)  Absence of Physical Injury: Implement safety measures based on patient assessment     Problem: Skin/Tissue Integrity - Adult  Goal: Skin integrity remains intact  Outcome: Progressing  Flowsheets (Taken 7/11/2023 0948)  Skin Integrity Remains Intact: Monitor for areas of redness and/or skin breakdown  Goal: Incisions, wounds, or drain sites healing without S/S of infection  Outcome:
Problem: Discharge Planning  Goal: Discharge to home or other facility with appropriate resources  7/12/2023 0117 by Kelly Higuera RN  Outcome: Progressing  7/11/2023 1240 by Beth Armstrong RN  Outcome: Progressing  Flowsheets (Taken 7/11/2023 6249)  Discharge to home or other facility with appropriate resources:   Identify barriers to discharge with patient and caregiver   Arrange for needed discharge resources and transportation as appropriate   Identify discharge learning needs (meds, wound care, etc)     Problem: Pain  Goal: Verbalizes/displays adequate comfort level or baseline comfort level  7/12/2023 0117 by Kelly Higuera RN  Outcome: Progressing  7/11/2023 1240 by Beth Armstrong RN  Outcome: Progressing  Flowsheets (Taken 7/11/2023 0743)  Verbalizes/displays adequate comfort level or baseline comfort level:   Encourage patient to monitor pain and request assistance   Assess pain using appropriate pain scale   Administer analgesics based on type and severity of pain and evaluate response   Implement non-pharmacological measures as appropriate and evaluate response   Consider cultural and social influences on pain and pain management   Notify Licensed Independent Practitioner if interventions unsuccessful or patient reports new pain     Problem: Safety - Adult  Goal: Free from fall injury  7/12/2023 0117 by Kelly Higuera RN  Outcome: Progressing  7/11/2023 1240 by Beth Armstrong RN  Outcome: Progressing  Flowsheets (Taken 7/11/2023 0948)  Free From Fall Injury: Instruct family/caregiver on patient safety     Problem: ABCDS Injury Assessment  Goal: Absence of physical injury  7/12/2023 0117 by Kelly Higuera RN  Outcome: Progressing  7/11/2023 1240 by Beth Armstrong RN  Outcome: Progressing  Flowsheets (Taken 7/11/2023 0948)  Absence of Physical Injury: Implement safety measures based on patient assessment     Problem: Skin/Tissue Integrity - Adult  Goal: Skin integrity remains
Problem: Discharge Planning  Goal: Discharge to home or other facility with appropriate resources  7/13/2023 0957 by Alfredo Land RN  Outcome: Progressing  7/12/2023 2246 by Asaf Salazar RN  Outcome: Progressing     Problem: Pain  Goal: Verbalizes/displays adequate comfort level or baseline comfort level  7/13/2023 0957 by Alfredo Land RN  Outcome: Progressing  7/12/2023 2246 by Asaf Salazar RN  Outcome: Progressing     Problem: Safety - Adult  Goal: Free from fall injury  7/13/2023 0957 by Alfredo Land RN  Outcome: Progressing  7/12/2023 2246 by Asaf Salazar RN  Outcome: Progressing     Problem: ABCDS Injury Assessment  Goal: Absence of physical injury  7/13/2023 0957 by Alfredo Land RN  Outcome: Progressing  7/12/2023 2246 by Asaf Salazar RN  Outcome: Progressing     Problem: Skin/Tissue Integrity - Adult  Goal: Skin integrity remains intact  7/13/2023 0957 by Alfredo Land RN  Outcome: Progressing  7/12/2023 2246 by Asaf Salazar RN  Outcome: Progressing  Goal: Incisions, wounds, or drain sites healing without S/S of infection  7/13/2023 0957 by Alfredo Land RN  Outcome: Progressing  Flowsheets (Taken 7/12/2023 2247 by Asaf Salazar RN)  Incisions, Wounds, or Drain Sites Healing Without Sign and Symptoms of Infection: TWICE DAILY: Assess and document skin integrity  7/12/2023 2246 by Asaf Salazar RN  Outcome: Progressing  Goal: Oral mucous membranes remain intact  7/13/2023 0957 by Alfredo Land RN  Outcome: Progressing  7/12/2023 2246 by Asaf Salazar RN  Outcome: Progressing     Problem: Musculoskeletal - Adult  Goal: Return mobility to safest level of function  7/13/2023 0957 by Alfredo Land RN  Outcome: Progressing  7/12/2023 2246 by Asaf Salazar RN  Outcome: Progressing  Goal: Maintain proper alignment of affected body part  7/13/2023 0957 by Alfredo Land RN  Outcome: Progressing  7/12/2023 2246 by Asaf Salazar RN  Outcome: Progressing  Goal: Return ADL status to a safe
Problem: Discharge Planning  Goal: Discharge to home or other facility with appropriate resources  7/14/2023 0942 by Madi Weinberg RN  Outcome: Progressing  7/14/2023 0027 by Saundra Kent RN  Outcome: Progressing     Problem: Pain  Goal: Verbalizes/displays adequate comfort level or baseline comfort level  7/14/2023 0942 by Madi Weinberg RN  Outcome: Progressing  7/14/2023 0027 by Saundra Kent RN  Outcome: Progressing  Flowsheets (Taken 7/13/2023 1928)  Verbalizes/displays adequate comfort level or baseline comfort level: Encourage patient to monitor pain and request assistance     Problem: Safety - Adult  Goal: Free from fall injury  7/14/2023 0942 by Madi Weinberg RN  Outcome: Progressing  7/14/2023 0027 by Saundra Kent RN  Outcome: Progressing  Flowsheets (Taken 7/13/2023 2256)  Free From Fall Injury: Instruct family/caregiver on patient safety     Problem: ABCDS Injury Assessment  Goal: Absence of physical injury  7/14/2023 0942 by Madi Weinberg RN  Outcome: Progressing  7/14/2023 0027 by Saundra Kent RN  Outcome: Progressing  Flowsheets (Taken 7/13/2023 2256)  Absence of Physical Injury: Implement safety measures based on patient assessment     Problem: Skin/Tissue Integrity - Adult  Goal: Skin integrity remains intact  7/14/2023 0942 by Madi Weinberg RN  Outcome: Progressing  7/14/2023 0027 by Saundra Kent RN  Outcome: Progressing  Flowsheets (Taken 7/13/2023 2256)  Skin Integrity Remains Intact: Monitor for areas of redness and/or skin breakdown  Goal: Incisions, wounds, or drain sites healing without S/S of infection  7/14/2023 0942 by Madi Weinberg RN  Outcome: Progressing  7/14/2023 0027 by Saundra Kent RN  Outcome: Progressing  Flowsheets (Taken 7/13/2023 2256)  Incisions, Wounds, or Drain Sites Healing Without Sign and Symptoms of Infection: TWICE DAILY: Assess and document skin integrity  Goal: Oral mucous membranes remain intact  7/14/2023 0942 by Madi Weinberg RN  Outcome:
Problem: Discharge Planning  Goal: Discharge to home or other facility with appropriate resources  7/14/2023 1634 by Tano Olson RN  Outcome: Completed  7/14/2023 0942 by Tano Olson RN  Outcome: Progressing     Problem: Pain  Goal: Verbalizes/displays adequate comfort level or baseline comfort level  7/14/2023 1634 by Tano Olson RN  Outcome: Completed  7/14/2023 0942 by Tano Olson RN  Outcome: Progressing     Problem: Safety - Adult  Goal: Free from fall injury  7/14/2023 1634 by Tano Olson RN  Outcome: Completed  7/14/2023 0942 by Tano Olson RN  Outcome: Progressing     Problem: ABCDS Injury Assessment  Goal: Absence of physical injury  7/14/2023 1634 by Tano Olson RN  Outcome: Completed  7/14/2023 0942 by Tano Olson RN  Outcome: Progressing     Problem: Skin/Tissue Integrity - Adult  Goal: Skin integrity remains intact  7/14/2023 1634 by Tano Olson RN  Outcome: Completed  7/14/2023 0942 by Tano Olson RN  Outcome: Progressing  Goal: Incisions, wounds, or drain sites healing without S/S of infection  7/14/2023 1634 by Tano Olson RN  Outcome: Completed  7/14/2023 0942 by Tano Olson RN  Outcome: Progressing  Goal: Oral mucous membranes remain intact  7/14/2023 1634 by Tano Olson RN  Outcome: Completed  7/14/2023 0942 by Tano Olson RN  Outcome: Progressing     Problem: Musculoskeletal - Adult  Goal: Return mobility to safest level of function  7/14/2023 1634 by Tano Olson RN  Outcome: Completed  7/14/2023 0942 by Tano Olson RN  Outcome: Progressing  Goal: Maintain proper alignment of affected body part  7/14/2023 1634 by Tano Olson RN  Outcome: Completed  7/14/2023 0942 by Tano Olson RN  Outcome: Progressing  Goal: Return ADL status to a safe level of function  7/14/2023 1634 by Tano Olson RN  Outcome: Completed  7/14/2023 0942 by Acquanetta Nenzel, RN  Outcome: Progressing     Problem: Infection - Adult  Goal: Absence of infection at
Problem: Discharge Planning  Goal: Discharge to home or other facility with appropriate resources  Outcome: Progressing     Problem: Pain  Goal: Verbalizes/displays adequate comfort level or baseline comfort level  Outcome: Progressing     Problem: Safety - Adult  Goal: Free from fall injury  Outcome: Progressing     Problem: ABCDS Injury Assessment  Goal: Absence of physical injury  Outcome: Progressing     Problem: Skin/Tissue Integrity - Adult  Goal: Skin integrity remains intact  Outcome: Progressing  Goal: Incisions, wounds, or drain sites healing without S/S of infection  Outcome: Progressing  Goal: Oral mucous membranes remain intact  Outcome: Progressing     Problem: Musculoskeletal - Adult  Goal: Return mobility to safest level of function  Outcome: Progressing  Goal: Maintain proper alignment of affected body part  Outcome: Progressing  Goal: Return ADL status to a safe level of function  Outcome: Progressing     Problem: Infection - Adult  Goal: Absence of infection at discharge  Outcome: Progressing  Goal: Absence of infection during hospitalization  Outcome: Progressing  Goal: Absence of fever/infection during anticipated neutropenic period  Outcome: Progressing     Problem: Metabolic/Fluid and Electrolytes - Adult  Goal: Electrolytes maintained within normal limits  Outcome: Progressing  Goal: Hemodynamic stability and optimal renal function maintained  Outcome: Progressing  Goal: Glucose maintained within prescribed range  Outcome: Progressing     Problem: Cognitive:  Goal: Knowledge of wound care  Description: Knowledge of wound care  Outcome: Progressing  Goal: Understands risk factors for wounds  Description: Understands risk factors for wounds  Outcome: Progressing
Problem: Discharge Planning  Goal: Discharge to home or other facility with appropriate resources  Outcome: Progressing  Flowsheets (Taken 7/10/2023 2340)  Discharge to home or other facility with appropriate resources:   Identify barriers to discharge with patient and caregiver   Arrange for needed discharge resources and transportation as appropriate   Identify discharge learning needs (meds, wound care, etc)   Arrange for interpreters to assist at discharge as needed   Refer to discharge planning if patient needs post-hospital services based on physician order or complex needs related to functional status, cognitive ability or social support system     Problem: Pain  Goal: Verbalizes/displays adequate comfort level or baseline comfort level  Outcome: Progressing  Flowsheets (Taken 7/10/2023 2333)  Verbalizes/displays adequate comfort level or baseline comfort level:   Encourage patient to monitor pain and request assistance   Assess pain using appropriate pain scale   Administer analgesics based on type and severity of pain and evaluate response   Consider cultural and social influences on pain and pain management   Implement non-pharmacological measures as appropriate and evaluate response   Notify Licensed Independent Practitioner if interventions unsuccessful or patient reports new pain     Problem: Safety - Adult  Goal: Free from fall injury  Outcome: Progressing     Problem: ABCDS Injury Assessment  Goal: Absence of physical injury  Outcome: Progressing
within normal limits  Outcome: Progressing  Goal: Hemodynamic stability and optimal renal function maintained  Outcome: Progressing  Goal: Glucose maintained within prescribed range  Outcome: Progressing     Problem: Cognitive:  Goal: Knowledge of wound care  Description: Knowledge of wound care  Outcome: Progressing  Goal: Understands risk factors for wounds  Description: Understands risk factors for wounds  Outcome: Progressing

## 2023-07-24 ENCOUNTER — CLINICAL DOCUMENTATION (OUTPATIENT)
Dept: PHARMACY | Facility: CLINIC | Age: 58
End: 2023-07-24

## 2023-07-24 NOTE — PROGRESS NOTES
Pharmacy Pop Care Documentation:     AVS received for required office visit on:  6/22/23 with Angel Archuleta MD  per careeverywhere    A1c and donavan updated      W.  Debbie Campos, PharmD,  [facility]  Department, toll free: [phone]      For Pharmacy Admin Tracking Only    Program: [address] [place] [address]  Time Spent (min): 10

## 2023-08-09 ENCOUNTER — TELEPHONE (OUTPATIENT)
Dept: PHARMACY | Facility: CLINIC | Age: 58
End: 2023-08-09

## 2023-08-09 NOTE — TELEPHONE ENCOUNTER
As of 8/8/23 patient has used $765 of the maximum of $600 a year in waived co pays for specific medications and pharmacy-related supplies through the [employer] for the DM Program.    Patient currently enrolled in the DM Program and has used all of the maximum of $600 a year in waived co pays for specific medications and pharmacy-related supplies through the [employer]. Courtesy call placed to patient to advise them of the above information. Patient unavailable at the time of call. Message left on TAD: Maximum waived co pays for the DM Program has been met and they will begin to be charged their co-payments once again. I left our number: [phone], option #3 if patient has any questions/concerns.         Elois Fabry, [place]   Phone: [phone], option #3       For Pharmacy Admin Tracking Only    Program: [place] in place:  No  Time Spent (min): 5

## 2023-08-18 ENCOUNTER — TELEPHONE (OUTPATIENT)
Dept: PHARMACY | Facility: CLINIC | Age: 58
End: 2023-08-18

## 2023-08-18 NOTE — TELEPHONE ENCOUNTER
Check in-  Recent infection- sepsis, dog bites  Mychart question      POPULATION HEALTH CLINICAL PHARMACY REVIEW - BE WELL WITH DIABETES: HIGH A1C  =============================================  Chhaya Shepherd is a 63 y.o. male enrolled in the [facility] Be Well with Diabetes program.    Identified care gap(s): A1c > 8%    DM Program Prescriptions:  Current Outpatient Medications   Medication Instructions    atorvastatin (LIPITOR) 80 mg, Oral, DAILY    Glucagon 3 mg, Nasal, PRN    Insulin Infusion Pump (T:SLIM INSULIN PUMP) MARLEY Does not apply    insulin lispro (HUMALOG) 100 UNIT/ML SOLN injection vial Up to 120 units via insulin pump    levothyroxine (SYNTHROID) 75 mcg, Oral, DAILY    losartan-hydroCHLOROthiazide (HYZAAR) 100-12.5 MG per tablet 1 tablet, Oral, DAILY    vitamin D (CHOLECALCIFEROL) 2,000 Units, Oral, DAILY        Allergies:  No Known Allergies     Labs:  Lab Results   Component Value Date    LABA1C 8.3 07/11/2023    LABA1C 7.9 06/22/2023    LABA1C 8.2 03/15/2023     Estimated Creatinine Clearance: 75 mL/min (A) (based on SCr of 1.4 mg/dL (H)). Care Team:   Physician (PCP): Hyacinth Tang (Last OV: 7/21)  - Upcoming appointments:   No future appointments. Diabetes Care:  - Glycemic Goal: <7.0%. Is not at blood glucose goal  - Appropriateness of Insulin Therapy: Using an insulin pump along with Dexcom G6  - Therapy Optimization: T1DM- insulin is the only treatment option. He is using a pump  - Since out discussion, pt had a dog bite which became infected. He ended up with sepsis and was hospitalized for a couple days. Pt looks to have since recovered and per OV note on 7/21 with PCP he is back on track. Plan:  - Will defer outreach for now and check back in a couple months.   Pt has been working with TCM through [facility].     Ijeoma Galvan, PharmD,  [facility]  Department, toll

## 2023-09-27 ENCOUNTER — CLINICAL DOCUMENTATION (OUTPATIENT)
Dept: PHARMACY | Facility: CLINIC | Age: 58
End: 2023-09-27

## 2023-09-27 NOTE — PROGRESS NOTES
Pharmacy Pop Care Documentation:     Janak Malin is being removed from the diabetes management program for the following reason(s):  Per [employer]: patient's benefits have been Termed     [address] Only    Program: [place] in place:  No  Gap Closed?: Yes   Time Spent (min): 5

## 2023-10-04 NOTE — FLOWSHEET NOTE
Dressing to left lower extremity C/D/I. Denies pain and discomfort.
Patient admitted from ED via stretcher to 4256. A&O X4. Denies pain and discomfort. Temp of 102.6, PRN Tylenol was given and Temp currently at 99.0. Patient has two dog bites to left leg, with stiches that was cleaned with NS and left open to air. Patient has a Insulin pump to right lower abd. MD notified and need for orders.
No
154.94

## 2025-07-11 ENCOUNTER — HOSPITAL ENCOUNTER (EMERGENCY)
Age: 60
Discharge: HOME | End: 2025-07-11
Attending: EMERGENCY MEDICINE
Payer: COMMERCIAL

## 2025-07-11 VITALS
RESPIRATION RATE: 14 BRPM | BODY MASS INDEX: 31.41 KG/M2 | WEIGHT: 238.1 LBS | HEART RATE: 88 BPM | TEMPERATURE: 99 F | OXYGEN SATURATION: 97 % | DIASTOLIC BLOOD PRESSURE: 89 MMHG | SYSTOLIC BLOOD PRESSURE: 155 MMHG
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
## 2025-07-11 PROCEDURE — 99283 EMERGENCY DEPT VISIT LOW MDM: CPT

## 2025-07-11 PROCEDURE — 6370000000 HC RX 637 (ALT 250 FOR IP): Performed by: EMERGENCY MEDICINE

## 2025-07-11 RX ORDER — ACETAMINOPHEN 325 MG/1
650 TABLET ORAL ONCE
Status: COMPLETED | OUTPATIENT
Start: 2025-07-11 | End: 2025-07-11

## 2025-07-11 RX ADMIN — ACETAMINOPHEN 650 MG: 325 TABLET ORAL at 22:52

## 2025-07-11 RX ADMIN — AMOXICILLIN AND CLAVULANATE POTASSIUM 1 TABLET: 875; 125 TABLET, FILM COATED ORAL at 22:52

## 2025-07-11 ASSESSMENT — PAIN - FUNCTIONAL ASSESSMENT
PAIN_FUNCTIONAL_ASSESSMENT: NONE - DENIES PAIN
PAIN_FUNCTIONAL_ASSESSMENT: NONE - DENIES PAIN

## 2025-07-12 NOTE — ED PROVIDER NOTES
I PERSONALLY SAW THE PATIENT AND PERFORMED A SUBSTANTIVE PORTION OF THE VISIT INCLUDING ALL ASPECTS OF THE MEDICAL DECISION MAKING PROCESS.    [facility] EMERGENCY DEPARTMENT  EMERGENCY DEPARTMENT ENCOUNTER      Pt Name: Dave Cates  MRN: [id number]  Birthdate 1965  Date of evaluation: 7/11/2025  Provider: Molina Galeas MD    CHIEF COMPLAINT       Chief Complaint   Patient presents with    Animal Bite     C/o multiple dog bites on right lower leg after being caught in between his dogs fighting. Pt states that he has diabetes and will need abx. States that he is up to date on tetanus.        HISTORY OF PRESENT ILLNESS    Dave Cates is a 60 y.o. male who presents to the emergency department with dog bite.  Dog bites right lower ankle.  Happened is prior to arrival.  Tetanus up-to-date.  Domestic.  No other associated symptoms.  No rash.  No known allergies per patient.  No known allergies to antibiotics for patient as well.    Nursing Notes were reviewed. Including nursing noted for FM, Surgical History, Past Medical History, Social History, vitals, and allergies; agree with all.     REVIEW OF SYSTEMS       Review of Systems    Except as noted above the remainder of the review of systems was reviewed and negative.     PAST MEDICAL HISTORY     Past Medical History:   Diagnosis Date    Arthritis     Diabetes mellitus (HCC)     Hyperlipidemia     Hypertension        SURGICAL HISTORY       Past Surgical History:   Procedure Laterality Date    FRACTURE SURGERY         CURRENT MEDICATIONS       Previous Medications    ATORVASTATIN (LIPITOR) 80 MG TABLET    Take 1 tablet by mouth daily    GLUCAGON 3 MG/DOSE POWD    3 mg by Nasal route as needed (hypoglycemia)    INSULIN INFUSION PUMP (T:SLIM INSULIN PUMP) MARLEY    by Does not apply route    INSULIN LISPRO (HUMALOG) 100 UNIT/ML SOLN INJECTION VIAL    Up to 120 units via insulin pump    LEVOTHYROXINE (SYNTHROID) 75 MCG TABLET    Take 1 tablet by mouth      ED BEDSIDE ULTRASOUND:   Performed by ED Physician - none    LABS:  Labs Reviewed - No data to display    All other labs were withinnormal range or not returned as of this dictation.    EMERGENCY DEPARTMENT COURSE and DIFFERENTIAL DIAGNOSIS/MDM:     PMH, Surgical Hx, FH, Social Hx reviewed by myself (ETOH usage, Tobacco usage, Drug usage reviewed by myself, no pertinent Hx)- No Pertinent Hx     Old records were reviewed by me     MDM  60-year-old with multiple superficial dog bite abrasions to the right ankle.  There was 1 area that was mildly deep.  1 stitch was applied with loose alignment per patient request.  Informed consent obtained.  Risks obtained.  Loose alignment because high infection risk.  Patient understands this.  The wound was copiously irrigated.  Discussed strict return precautions with patient.  Outpatient follow-up.  Patient verbalized understanding.  Will return if infection shows.  Tetanus up-to-date.  Neurovascular intact.  Wound care provided.  Augmentin.  Outpatient follow-up.    I estimate there is LOW risk for Sepsis, MI, Stroke, Tamponade, PTX, Toxicity or other life threatening etiology thus I consider the discharge disposition reasonable.     The patient is at low risk for mortality based on demographic, history and clinical factors. Given the best available information and clinical assessment, I estimate the risk of hospitalization to be greater than risk of treatment at home. I have explained to the patient that the risk could rapidly change, given precautions for return and instructions. Explained to patient that the risk for mortality is low based on demographic, history and clinical factors.     I discussed with patient the results of evaluation in the ED, diagnosis, care, and prognosis.  The plan is to discharge to home.  Patient is in agreement with plan and questions have been answered.      I also discussed with patient the reasons which may require a return visit and the

## 2025-07-12 NOTE — ED TRIAGE NOTES
C/o multiple dog bites on right lower leg after being caught in between his dogs fighting. Pt states that he has diabetes and will need abx. States that he is up to date on tetanus and his dogs are up to date on their shots.